# Patient Record
Sex: MALE | Race: WHITE | NOT HISPANIC OR LATINO | Employment: FULL TIME | URBAN - METROPOLITAN AREA
[De-identification: names, ages, dates, MRNs, and addresses within clinical notes are randomized per-mention and may not be internally consistent; named-entity substitution may affect disease eponyms.]

---

## 2017-08-21 ENCOUNTER — OFFICE VISIT (OUTPATIENT)
Dept: FAMILY MEDICINE | Facility: CLINIC | Age: 27
End: 2017-08-21
Payer: COMMERCIAL

## 2017-08-21 VITALS
HEART RATE: 61 BPM | SYSTOLIC BLOOD PRESSURE: 140 MMHG | TEMPERATURE: 98.5 F | DIASTOLIC BLOOD PRESSURE: 100 MMHG | OXYGEN SATURATION: 98 % | BODY MASS INDEX: 41.43 KG/M2 | WEIGHT: 273.4 LBS | HEIGHT: 68 IN

## 2017-08-21 DIAGNOSIS — F41.9 ANXIETY: Primary | ICD-10-CM

## 2017-08-21 DIAGNOSIS — Z13.29 SCREENING FOR THYROID DISORDER: ICD-10-CM

## 2017-08-21 DIAGNOSIS — Z13.0 SCREENING FOR DISORDER OF BLOOD AND BLOOD-FORMING ORGANS: ICD-10-CM

## 2017-08-21 DIAGNOSIS — R03.0 ELEVATED BLOOD PRESSURE READING WITHOUT DIAGNOSIS OF HYPERTENSION: ICD-10-CM

## 2017-08-21 DIAGNOSIS — Z13.220 LIPID SCREENING: ICD-10-CM

## 2017-08-21 DIAGNOSIS — Z13.1 SCREENING FOR DIABETES MELLITUS: ICD-10-CM

## 2017-08-21 PROCEDURE — 99214 OFFICE O/P EST MOD 30 MIN: CPT | Performed by: PHYSICIAN ASSISTANT

## 2017-08-21 RX ORDER — ESCITALOPRAM OXALATE 20 MG/1
TABLET ORAL
Refills: 1 | COMMUNITY
Start: 2017-08-15 | End: 2017-08-21

## 2017-08-21 RX ORDER — ESCITALOPRAM OXALATE 20 MG/1
TABLET ORAL
Qty: 30 TABLET | Refills: 1 | Status: SHIPPED | OUTPATIENT
Start: 2017-08-21 | End: 2017-11-30

## 2017-08-21 RX ORDER — BUSPIRONE HYDROCHLORIDE 5 MG/1
TABLET ORAL
Qty: 150 TABLET | Refills: 0 | Status: SHIPPED | OUTPATIENT
Start: 2017-08-21 | End: 2017-10-02 | Stop reason: DRUGHIGH

## 2017-08-21 ASSESSMENT — PATIENT HEALTH QUESTIONNAIRE - PHQ9
5. POOR APPETITE OR OVEREATING: NEARLY EVERY DAY
SUM OF ALL RESPONSES TO PHQ QUESTIONS 1-9: 7

## 2017-08-21 ASSESSMENT — ANXIETY QUESTIONNAIRES
6. BECOMING EASILY ANNOYED OR IRRITABLE: NEARLY EVERY DAY
IF YOU CHECKED OFF ANY PROBLEMS ON THIS QUESTIONNAIRE, HOW DIFFICULT HAVE THESE PROBLEMS MADE IT FOR YOU TO DO YOUR WORK, TAKE CARE OF THINGS AT HOME, OR GET ALONG WITH OTHER PEOPLE: SOMEWHAT DIFFICULT
GAD7 TOTAL SCORE: 15
2. NOT BEING ABLE TO STOP OR CONTROL WORRYING: NEARLY EVERY DAY
7. FEELING AFRAID AS IF SOMETHING AWFUL MIGHT HAPPEN: NOT AT ALL
1. FEELING NERVOUS, ANXIOUS, OR ON EDGE: NEARLY EVERY DAY
5. BEING SO RESTLESS THAT IT IS HARD TO SIT STILL: NOT AT ALL
3. WORRYING TOO MUCH ABOUT DIFFERENT THINGS: NEARLY EVERY DAY

## 2017-08-21 NOTE — MR AVS SNAPSHOT
After Visit Summary   8/21/2017    Chao Pruett    MRN: [id number]           Patient Information     Date Of Birth          1990        Visit Information        Provider Department      8/21/2017 4:15 PM Aaseby-Aguilera, Ramona Ann, PA-C [facility]        Today's Diagnoses     Anxiety    -  1    Screening for diabetes mellitus        Lipid screening        Screening for disorder of blood and blood-forming organs        Screening for thyroid disorder        Elevated blood pressure reading without diagnosis of hypertension          Care Instructions    (F41.9) Anxiety  (primary encounter diagnosis)  Comment: well add buspar  Plan: busPIRone (BUSPAR) 5 MG tablet, escitalopram         (LEXAPRO) 20 MG tablet     Start at 5 mg twice daily for 3 days,   then 7.5 mg (1.5 tabs) twice daily for 3 days,   then 10 mg (2 tabs) twice daily for 3 days,   then 12.5 mg (2.5 tabs) twice daily for 3 days,   then 15 mg (3 tabs) twice daily and stay at that dose  Follow-up in 3 weeks and make it a physical     (Z13.1) Screening for diabetes mellitus  Comment:   Plan: Comprehensive metabolic panel            (Z13.220) Lipid screening  Comment:   Plan: Lipid panel reflex to direct LDL            (Z13.0) Screening for disorder of blood and blood-forming organs  Comment:   Plan: CBC with platelets            (Z13.29) Screening for thyroid disorder  Comment:   Plan: TSH with free T4 reflex            (R03.0) Elevated blood pressure reading without diagnosis of hypertension  Comment:   Plan:  Will have come in for nurse only BP check x 2 and then follow-up for office visit in 3 weeks  with BP diary.                  Follow-ups after your visit        Future tests that were ordered for you today     Open Future Orders        Priority Expected Expires Ordered    CBC with platelets Routine  12/21/2017 8/21/2017    TSH with free T4 reflex Routine  12/21/2017 8/21/2017    Comprehensive metabolic panel  "Routine  12/21/2017 8/21/2017    Lipid panel reflex to direct LDL Routine  12/21/2017 8/21/2017            Who to contact     If you have questions or need follow up information about today's clinic visit or your schedule please contact [facility] directly at [phone].  Normal or non-critical lab and imaging results will be communicated to you by MyChart, letter or phone within 4 business days after the clinic has received the results. If you do not hear from us within 7 days, please contact the clinic through [employer] or phone. If you have a critical or abnormal lab result, we will notify you by phone as soon as possible.  Submit refill requests through [employer] or call your pharmacy and they will forward the refill request to us. Please allow 3 business days for your refill to be completed.          Additional Information About Your Visit        MyChart Information     [employer] gives you secure access to your electronic health record. If you see a primary care provider, you can also send messages to your care team and make appointments. If you have questions, please call your primary care clinic.  If you do not have a primary care provider, please call [phone] and they will assist you.        Care EveryWhere ID     This is your Care EveryWhere ID. This could be used by other organizations to access your [place] medical records  [id number]        Your Vitals Were     Pulse Temperature Height Pulse Oximetry BMI (Body Mass Index)       61 98.5  F (36.9  C) (Oral) 5' 8\" (1.727 m) 98% 41.57 kg/m2        Blood Pressure from Last 3 Encounters:   08/21/17 (!) 140/100   06/23/14 130/80   08/06/13 122/78    Weight from Last 3 Encounters:   08/21/17 273 lb 6.4 oz (124 kg)   06/23/14 217 lb 8 oz (98.7 kg)   08/06/13 201 lb 1.6 oz (91.2 kg)                 Today's Medication Changes          These changes are accurate as of: 8/21/17  4:49 PM.  If you have any questions, ask your nurse or doctor.       "         Start taking these medicines.        Dose/Directions    busPIRone 5 MG tablet   Commonly known as:  BUSPAR   Used for:  Anxiety   Started by:  Aaseby-Aguilera, Ramona Ann, PA-C        Start at 5 mg twice daily for 3 days, then 7.5 mg (1.5 tabs) twice daily for 3 days, then 10 mg (2 tabs) twice daily for 3 days, then 12.5 mg (2.5 tabs) twice daily for 3 days, then 15 mg (3 tabs) twice daily and stay at that dose   Quantity:  150 tablet   Refills:  0         These medicines have changed or have updated prescriptions.        Dose/Directions    escitalopram 20 MG tablet   Commonly known as:  LEXAPRO   This may have changed:  See the new instructions.   Used for:  Anxiety   Changed by:  Aaseby-Aguilera, Ramona Ann, PA-C        TAKE 1 TABLET BY MOUTH ONCE DAILY.   Quantity:  30 tablet   Refills:  1            Where to get your medicines      These medications were sent to [place] [id number]    Hours:  Tech issues with their phone system Phone:  [phone]     escitalopram 20 MG tablet         Some of these will need a paper prescription and others can be bought over the counter.  Ask your nurse if you have questions.     Bring a paper prescription for each of these medications     busPIRone 5 MG tablet                Primary Care Provider Office Phone # Fax #    Ramona Ann Aaseby-Aguilera, PA-C [phone] [address] [address] [address]        Equal Access to Services     WILSON SWANSON : Hadii aad ku hadasho Soomaali, waaxda luqadaha, qaybta kaalmada adeegyada, waxay cecein haypratibhan khari grant. So [facility] [phone].    ATENCIÓN: Si habla español, tiene a castellano disposición servicios gratuitos de asistencia lingüística. Llame al [phone].    We comply with applicable federal civil rights laws and [place] laws. We do not discriminate on the basis of race, color, national origin, age, disability  sex, sexual orientation or gender identity.            Thank you!     Thank you for choosing [facility]  for your care. Our goal is always to provide you with excellent care. Hearing back from our patients is one way we can continue to improve our services. Please take a few minutes to complete the written survey that you may receive in the mail after your visit with us. Thank you!             Your Updated Medication List - Protect others around you: Learn how to safely use, store and throw away your medicines at www.disposemymeds.org.          This list is accurate as of: 8/21/17  4:49 PM.  Always use your most recent med list.                   Brand Name Dispense Instructions for use Diagnosis    busPIRone 5 MG tablet    BUSPAR    150 tablet    Start at 5 mg twice daily for 3 days, then 7.5 mg (1.5 tabs) twice daily for 3 days, then 10 mg (2 tabs) twice daily for 3 days, then 12.5 mg (2.5 tabs) twice daily for 3 days, then 15 mg (3 tabs) twice daily and stay at that dose    Anxiety       escitalopram 20 MG tablet    LEXAPRO    30 tablet    TAKE 1 TABLET BY MOUTH ONCE DAILY.    Anxiety

## 2017-08-21 NOTE — NURSING NOTE
"Chief Complaint   Patient presents with     Recheck Medication       Initial BP (!) 140/100 (BP Location: Right arm, Patient Position: Chair, Cuff Size: Adult Large)  Pulse 61  Temp 98.5  F (36.9  C) (Oral)  Ht 5' 8\" (1.727 m)  Wt 273 lb 6.4 oz (124 kg)  SpO2 98%  BMI 41.57 kg/m2 Estimated body mass index is 41.57 kg/(m^2) as calculated from the following:    Height as of this encounter: 5' 8\" (1.727 m).    Weight as of this encounter: 273 lb 6.4 oz (124 kg).  Medication Reconciliation: complete   NESTOR Sung      "

## 2017-08-21 NOTE — PROGRESS NOTES
"  SUBJECTIVE:   Chao Pruett is a 26 year old male who presents to clinic today for the following health issues:      Medication Followup of escitalopram     Taking Medication as prescribed: NO-doubling the dose     Side Effects:  None    Medication Helping Symptoms:  NO-needs more        Also, he has additional complaints of blood pressure elevated.        Problem list and histories reviewed & adjusted, as indicated.  Additional history: as documented    Current Outpatient Prescriptions   Medication Sig Dispense Refill     busPIRone (BUSPAR) 5 MG tablet Start at 5 mg twice daily for 3 days, then 7.5 mg (1.5 tabs) twice daily for 3 days, then 10 mg (2 tabs) twice daily for 3 days, then 12.5 mg (2.5 tabs) twice daily for 3 days, then 15 mg (3 tabs) twice daily and stay at that dose 150 tablet 0     escitalopram (LEXAPRO) 20 MG tablet TAKE 1 TABLET BY MOUTH ONCE DAILY. 30 tablet 1     BP Readings from Last 3 Encounters:   08/21/17 (!) 140/100   06/23/14 130/80   08/06/13 122/78    Wt Readings from Last 3 Encounters:   08/21/17 273 lb 6.4 oz (124 kg)   06/23/14 217 lb 8 oz (98.7 kg)   08/06/13 201 lb 1.6 oz (91.2 kg)                      Reviewed and updated as needed this visit by clinical staffAllergies       Reviewed and updated as needed this visit by Provider         ROS:  Constitutional, HEENT, cardiovascular, pulmonary, gi and gu systems are negative, except as otherwise noted.      OBJECTIVE:                                                    BP (!) 140/100 (BP Location: Right arm, Patient Position: Chair, Cuff Size: Adult Large)  Pulse 61  Temp 98.5  F (36.9  C) (Oral)  Ht 5' 8\" (1.727 m)  Wt 273 lb 6.4 oz (124 kg)  SpO2 98%  BMI 41.57 kg/m2  Body mass index is 41.57 kg/(m^2).  GENERAL APPEARANCE: healthy, alert and no distress  RESP: lungs clear to auscultation - no rales, rhonchi or wheezes  CV: regular rates and rhythm, normal S1 S2, no S3 or S4 and no murmur, click or rub  MS: extremities " normal- no gross deformities noted  MENTAL STATUS EXAM:  Appearance/Behavior: No apparent distress and Casually groomed  Speech: Normal  Mood/Affect: depressed affect  Insight: Adequate      Diagnostic test results:  Diagnostic Test Results:  none        ASSESSMENT/PLAN:                                                    1. Screening for diabetes mellitus    - Comprehensive metabolic panel; Future    2. Lipid screening    - Lipid panel reflex to direct LDL; Future    3. Screening for disorder of blood and blood-forming organs    - CBC with platelets; Future    4. Screening for thyroid disorder    - TSH with free T4 reflex; Future    5. Anxiety    - busPIRone (BUSPAR) 5 MG tablet; Start at 5 mg twice daily for 3 days, then 7.5 mg (1.5 tabs) twice daily for 3 days, then 10 mg (2 tabs) twice daily for 3 days, then 12.5 mg (2.5 tabs) twice daily for 3 days, then 15 mg (3 tabs) twice daily and stay at that dose  Dispense: 150 tablet; Refill: 0  - escitalopram (LEXAPRO) 20 MG tablet; TAKE 1 TABLET BY MOUTH ONCE DAILY.  Dispense: 30 tablet; Refill: 1    6. Elevated blood pressure reading without diagnosis of hypertension        See Patient Instructions    Ramona Ann Aaseby-Aguilera, PA-C  [facility]  Patient Instructions   (F41.9) Anxiety  (primary encounter diagnosis)  Comment: well add buspar  Plan: busPIRone (BUSPAR) 5 MG tablet, escitalopram         (LEXAPRO) 20 MG tablet     Start at 5 mg twice daily for 3 days,   then 7.5 mg (1.5 tabs) twice daily for 3 days,   then 10 mg (2 tabs) twice daily for 3 days,   then 12.5 mg (2.5 tabs) twice daily for 3 days,   then 15 mg (3 tabs) twice daily and stay at that dose  Follow-up in 3 weeks and make it a physical     (Z13.1) Screening for diabetes mellitus  Comment:   Plan: Comprehensive metabolic panel            (Z13.220) Lipid screening  Comment:   Plan: Lipid panel reflex to direct LDL            (Z13.0) Screening for disorder of blood and blood-forming  organs  Comment:   Plan: CBC with platelets            (Z13.29) Screening for thyroid disorder  Comment:   Plan: TSH with free T4 reflex            (R03.0) Elevated blood pressure reading without diagnosis of hypertension  Comment:   Plan:  Will have come in for nurse only BP check x 2 and then follow-up for office visit in 3 weeks  with BP diary.

## 2017-08-21 NOTE — PATIENT INSTRUCTIONS
(F41.9) Anxiety  (primary encounter diagnosis)  Comment: well add buspar  Plan: busPIRone (BUSPAR) 5 MG tablet, escitalopram         (LEXAPRO) 20 MG tablet     Start at 5 mg twice daily for 3 days,   then 7.5 mg (1.5 tabs) twice daily for 3 days,   then 10 mg (2 tabs) twice daily for 3 days,   then 12.5 mg (2.5 tabs) twice daily for 3 days,   then 15 mg (3 tabs) twice daily and stay at that dose  Follow-up in 3 weeks and make it a physical     (Z13.1) Screening for diabetes mellitus  Comment:   Plan: Comprehensive metabolic panel            (Z13.220) Lipid screening  Comment:   Plan: Lipid panel reflex to direct LDL            (Z13.0) Screening for disorder of blood and blood-forming organs  Comment:   Plan: CBC with platelets            (Z13.29) Screening for thyroid disorder  Comment:   Plan: TSH with free T4 reflex            (R03.0) Elevated blood pressure reading without diagnosis of hypertension  Comment:   Plan:  Will have come in for nurse only BP check x 2 and then follow-up for office visit in 3 weeks  with BP diary.

## 2017-08-22 ASSESSMENT — ANXIETY QUESTIONNAIRES: GAD7 TOTAL SCORE: 15

## 2017-09-01 ENCOUNTER — ALLIED HEALTH/NURSE VISIT (OUTPATIENT)
Dept: NURSING | Facility: CLINIC | Age: 27
End: 2017-09-01
Payer: COMMERCIAL

## 2017-09-01 VITALS — DIASTOLIC BLOOD PRESSURE: 86 MMHG | SYSTOLIC BLOOD PRESSURE: 130 MMHG

## 2017-09-01 DIAGNOSIS — Z13.0 SCREENING FOR DISORDER OF BLOOD AND BLOOD-FORMING ORGANS: ICD-10-CM

## 2017-09-01 DIAGNOSIS — Z13.220 LIPID SCREENING: ICD-10-CM

## 2017-09-01 DIAGNOSIS — Z13.29 SCREENING FOR THYROID DISORDER: ICD-10-CM

## 2017-09-01 DIAGNOSIS — Z01.30 BP CHECK: Primary | ICD-10-CM

## 2017-09-01 DIAGNOSIS — Z13.1 SCREENING FOR DIABETES MELLITUS: ICD-10-CM

## 2017-09-01 LAB
ERYTHROCYTE [DISTWIDTH] IN BLOOD BY AUTOMATED COUNT: 13.2 % (ref 10–15)
HCT VFR BLD AUTO: 45.8 % (ref 40–53)
HGB BLD-MCNC: 16.7 G/DL (ref 13.3–17.7)
MCH RBC QN AUTO: 28.8 PG (ref 26.5–33)
MCHC RBC AUTO-ENTMCNC: 36.5 G/DL (ref 31.5–36.5)
MCV RBC AUTO: 79 FL (ref 78–100)
PLATELET # BLD AUTO: 201 10E9/L (ref 150–450)
RBC # BLD AUTO: 5.79 10E12/L (ref 4.4–5.9)
WBC # BLD AUTO: 9.6 10E9/L (ref 4–11)

## 2017-09-01 PROCEDURE — 99207 ZZC NO CHARGE NURSE ONLY: CPT

## 2017-09-01 PROCEDURE — 80053 COMPREHEN METABOLIC PANEL: CPT | Performed by: PHYSICIAN ASSISTANT

## 2017-09-01 PROCEDURE — 36415 COLL VENOUS BLD VENIPUNCTURE: CPT | Performed by: PHYSICIAN ASSISTANT

## 2017-09-01 PROCEDURE — 84443 ASSAY THYROID STIM HORMONE: CPT | Performed by: PHYSICIAN ASSISTANT

## 2017-09-01 PROCEDURE — 80061 LIPID PANEL: CPT | Performed by: PHYSICIAN ASSISTANT

## 2017-09-01 PROCEDURE — 85027 COMPLETE CBC AUTOMATED: CPT | Performed by: PHYSICIAN ASSISTANT

## 2017-09-02 LAB
ALBUMIN SERPL-MCNC: 4.3 G/DL (ref 3.4–5)
ALP SERPL-CCNC: 84 U/L (ref 40–150)
ALT SERPL W P-5'-P-CCNC: 134 U/L (ref 0–70)
ANION GAP SERPL CALCULATED.3IONS-SCNC: 5 MMOL/L (ref 3–14)
AST SERPL W P-5'-P-CCNC: 50 U/L (ref 0–45)
BILIRUB SERPL-MCNC: 0.9 MG/DL (ref 0.2–1.3)
BUN SERPL-MCNC: 17 MG/DL (ref 7–30)
CALCIUM SERPL-MCNC: 9.6 MG/DL (ref 8.5–10.1)
CHLORIDE SERPL-SCNC: 106 MMOL/L (ref 94–109)
CHOLEST SERPL-MCNC: 193 MG/DL
CO2 SERPL-SCNC: 28 MMOL/L (ref 20–32)
CREAT SERPL-MCNC: 1.17 MG/DL (ref 0.66–1.25)
GFR SERPL CREATININE-BSD FRML MDRD: 75 ML/MIN/1.7M2
GLUCOSE SERPL-MCNC: 91 MG/DL (ref 70–99)
HDLC SERPL-MCNC: 53 MG/DL
LDLC SERPL CALC-MCNC: 118 MG/DL
NONHDLC SERPL-MCNC: 140 MG/DL
POTASSIUM SERPL-SCNC: 4.3 MMOL/L (ref 3.4–5.3)
PROT SERPL-MCNC: 7.7 G/DL (ref 6.8–8.8)
SODIUM SERPL-SCNC: 139 MMOL/L (ref 133–144)
TRIGL SERPL-MCNC: 111 MG/DL
TSH SERPL DL<=0.005 MIU/L-ACNC: 0.57 MU/L (ref 0.4–4)

## 2017-09-25 DIAGNOSIS — F41.9 ANXIETY: ICD-10-CM

## 2017-09-25 NOTE — LETTER
[facility]  [address] [address] [address] [phone]          September 28, 2017    Chao Pruett                                                                                                                     [address] [address] [address]            Dear Chao,    We have made several attempts to contact you by phone regarding a medication refill.  Since we have not heard from you we cannot refill the medication.  You are due for a physical and medication check.  Please contact the clinic at the number above.    Sincerely,     Ramona Ann Aaseby-Aguilera PA-C/Colten Archibald RN, BSN

## 2017-09-25 NOTE — TELEPHONE ENCOUNTER
Pending Prescriptions:                       Disp   Refills    busPIRone (BUSPAR) 5 MG tablet [Pharmacy *150 ta*0            Sig: SEE ATTACHED INSTRUCTIONS               Last Written Prescription Date: 8/21/2017  Last Fill Quantity: 150; # refills: 0  Last Office Visit with [facility], [facility] or [facility] prescribing provider:  8/21/2017, Aaseby-Aguilera          Last PHQ-9 score on record=   PHQ-9 SCORE 8/21/2017   Total Score -   Total Score 7       Lab Results   Component Value Date    AST 50 09/01/2017     Lab Results   Component Value Date     09/01/2017

## 2017-09-26 NOTE — TELEPHONE ENCOUNTER
LMTRC  Need to verify current dose and pt is due for a physical per LOV    Start at 5 mg twice daily for 3 days,   then 7.5 mg (1.5 tabs) twice daily for 3 days,   then 10 mg (2 tabs) twice daily for 3 days,   then 12.5 mg (2.5 tabs) twice daily for 3 days,   then 15 mg (3 tabs) twice daily and stay at that dose  Follow-up in 3 weeks and make it a physical

## 2017-09-28 RX ORDER — BUSPIRONE HYDROCHLORIDE 5 MG/1
TABLET ORAL
Qty: 150 TABLET | Refills: 0 | OUTPATIENT
Start: 2017-09-28

## 2017-09-28 NOTE — TELEPHONE ENCOUNTER
Pt not returning calls to verify dosing and schedule physical  Please advise  Colten Archibald RN, BSN

## 2017-10-02 ENCOUNTER — OFFICE VISIT (OUTPATIENT)
Dept: FAMILY MEDICINE | Facility: CLINIC | Age: 27
End: 2017-10-02
Payer: COMMERCIAL

## 2017-10-02 VITALS
HEART RATE: 72 BPM | WEIGHT: 279.5 LBS | DIASTOLIC BLOOD PRESSURE: 70 MMHG | OXYGEN SATURATION: 97 % | BODY MASS INDEX: 42.36 KG/M2 | HEIGHT: 68 IN | SYSTOLIC BLOOD PRESSURE: 127 MMHG | TEMPERATURE: 98.1 F

## 2017-10-02 DIAGNOSIS — R74.8 ELEVATED LIVER ENZYMES: ICD-10-CM

## 2017-10-02 DIAGNOSIS — F41.9 ANXIETY: Primary | ICD-10-CM

## 2017-10-02 PROBLEM — F43.22 ADJUSTMENT DISORDER WITH ANXIOUS MOOD: Status: ACTIVE | Noted: 2017-10-02

## 2017-10-02 PROCEDURE — 80053 COMPREHEN METABOLIC PANEL: CPT | Performed by: PHYSICIAN ASSISTANT

## 2017-10-02 PROCEDURE — 99213 OFFICE O/P EST LOW 20 MIN: CPT | Performed by: PHYSICIAN ASSISTANT

## 2017-10-02 PROCEDURE — 36415 COLL VENOUS BLD VENIPUNCTURE: CPT | Performed by: PHYSICIAN ASSISTANT

## 2017-10-02 RX ORDER — BUSPIRONE HYDROCHLORIDE 15 MG/1
15 TABLET ORAL 2 TIMES DAILY
Qty: 60 TABLET | Refills: 5 | Status: SHIPPED | OUTPATIENT
Start: 2017-10-02 | End: 2017-11-30

## 2017-10-02 ASSESSMENT — ANXIETY QUESTIONNAIRES
IF YOU CHECKED OFF ANY PROBLEMS ON THIS QUESTIONNAIRE, HOW DIFFICULT HAVE THESE PROBLEMS MADE IT FOR YOU TO DO YOUR WORK, TAKE CARE OF THINGS AT HOME, OR GET ALONG WITH OTHER PEOPLE: NOT DIFFICULT AT ALL
7. FEELING AFRAID AS IF SOMETHING AWFUL MIGHT HAPPEN: NOT AT ALL
6. BECOMING EASILY ANNOYED OR IRRITABLE: SEVERAL DAYS
GAD7 TOTAL SCORE: 3
1. FEELING NERVOUS, ANXIOUS, OR ON EDGE: NOT AT ALL
3. WORRYING TOO MUCH ABOUT DIFFERENT THINGS: SEVERAL DAYS
5. BEING SO RESTLESS THAT IT IS HARD TO SIT STILL: NOT AT ALL
2. NOT BEING ABLE TO STOP OR CONTROL WORRYING: NOT AT ALL

## 2017-10-02 ASSESSMENT — PATIENT HEALTH QUESTIONNAIRE - PHQ9: 5. POOR APPETITE OR OVEREATING: SEVERAL DAYS

## 2017-10-02 NOTE — PROGRESS NOTES
"  SUBJECTIVE:   Chao Pruett is a 26 year old male who presents to clinic today for the following health issues:      Medication Followup of buspar    Taking Medication as prescribed: yes    Side Effects:  None    Medication Helping Symptoms:  yes             Problem list and histories reviewed & adjusted, as indicated.  Additional history: as documented    Current Outpatient Prescriptions   Medication Sig Dispense Refill     busPIRone (BUSPAR) 15 MG tablet Take 1 tablet (15 mg) by mouth 2 times daily 60 tablet 5     escitalopram (LEXAPRO) 20 MG tablet TAKE 1 TABLET BY MOUTH ONCE DAILY. 30 tablet 1     BP Readings from Last 3 Encounters:   10/02/17 127/70   09/01/17 130/86   08/21/17 (!) 140/100    Wt Readings from Last 3 Encounters:   10/02/17 279 lb 8 oz (126.8 kg)   08/21/17 273 lb 6.4 oz (124 kg)   06/23/14 217 lb 8 oz (98.7 kg)                      Reviewed and updated as needed this visit by clinical staffTobacco  Allergies  Meds  Med Hx  Surg Hx  Fam Hx  Soc Hx      Reviewed and updated as needed this visit by Provider         ROS:  Constitutional, HEENT, cardiovascular, pulmonary, gi and gu systems are negative, except as otherwise noted.      OBJECTIVE:                                                    /70 (BP Location: Right arm, Patient Position: Chair, Cuff Size: Adult Large)  Pulse 72  Temp 98.1  F (36.7  C) (Oral)  Ht 5' 8\" (1.727 m)  Wt 279 lb 8 oz (126.8 kg)  SpO2 97%  BMI 42.5 kg/m2  Body mass index is 42.5 kg/(m^2).  GENERAL APPEARANCE: healthy, alert and no distress  RESP: lungs clear to auscultation - no rales, rhonchi or wheezes  CV: regular rates and rhythm, normal S1 S2, no S3 or S4 and no murmur, click or rub  MENTAL STATUS EXAM:  Appearance/Behavior: No apparent distress and Neatly groomed  Speech: Normal  Mood/Affect: normal affect  Insight: Adequate      Diagnostic test results:  Diagnostic Test Results:  none        ASSESSMENT/PLAN:                                  "                   1. Anxiety  Stable and doing well   Refilled for 15 mg bid.   Follow-up in 6 months   - busPIRone (BUSPAR) 15 MG tablet; Take 1 tablet (15 mg) by mouth 2 times daily  Dispense: 60 tablet; Refill: 5    2. Elevated liver enzymes  Had elevated liver enzymes at routine health maintenance checkup.  Well repeat to document resolution   - Comprehensive metabolic panel      There are no Patient Instructions on file for this visit.    Ramona Ann Aaseby-Aguilera, PA-C  [facility]

## 2017-10-02 NOTE — MR AVS SNAPSHOT
"              After Visit Summary   10/2/2017    Chao Pruett    MRN: [id number]           Patient Information     Date Of Birth          1990        Visit Information        Provider Department      10/2/2017 4:00 PM Aaseby-Aguilera, Ramona Ann, PA-C [facility]        Today's Diagnoses     Anxiety    -  1    Elevated liver enzymes           Follow-ups after your visit        Who to contact     If you have questions or need follow up information about today's clinic visit or your schedule please contact [facility] directly at [phone].  Normal or non-critical lab and imaging results will be communicated to you by Ruth Kunstadter â€“ [employer], letter or phone within 4 business days after the clinic has received the results. If you do not hear from us within 7 days, please contact the clinic through [employer] or phone. If you have a critical or abnormal lab result, we will notify you by phone as soon as possible.  Submit refill requests through [employer] or call your pharmacy and they will forward the refill request to us. Please allow 3 business days for your refill to be completed.          Additional Information About Your Visit        MyChart Information     [employer] gives you secure access to your electronic health record. If you see a primary care provider, you can also send messages to your care team and make appointments. If you have questions, please call your primary care clinic.  If you do not have a primary care provider, please call [phone] and they will assist you.        Care EveryWhere ID     This is your Care EveryWhere ID. This could be used by other organizations to access your [place] medical records  [id number]        Your Vitals Were     Pulse Temperature Height Pulse Oximetry BMI (Body Mass Index)       72 98.1  F (36.7  C) (Oral) 5' 8\" (1.727 m) 97% 42.5 kg/m2        Blood Pressure from Last 3 Encounters:   10/02/17 127/70   09/01/17 130/86   08/21/17 (!) 140/100    " Weight from Last 3 Encounters:   10/02/17 279 lb 8 oz (126.8 kg)   08/21/17 273 lb 6.4 oz (124 kg)   06/23/14 217 lb 8 oz (98.7 kg)              We Performed the Following     Comprehensive metabolic panel          Today's Medication Changes          These changes are accurate as of: 10/2/17 11:59 PM.  If you have any questions, ask your nurse or doctor.               These medicines have changed or have updated prescriptions.        Dose/Directions    busPIRone 15 MG tablet   Commonly known as:  BUSPAR   This may have changed:    - medication strength  - how much to take  - how to take this  - when to take this  - additional instructions   Used for:  Anxiety   Changed by:  Aaseby-Aguilera, Ramona Ann, PA-C        Dose:  15 mg   Take 1 tablet (15 mg) by mouth 2 times daily   Quantity:  60 tablet   Refills:  5            Where to get your medicines      These medications were sent to [place] [address] [place] [address] [address]    Hours:  Tech issues with their phone system Phone:  [phone]     busPIRone 15 MG tablet                Primary Care Provider Office Phone # Fax #    Ramona Ann Aaseby-Aguilera, PA-C [phone] [address]  [facility] [address]        Equal Access to Services     WILSON SWANSON AH: Hadii jane ku hadasho Soomaali, waaxda luqadaha, qaybta kaalmada adeegyada, jeffrey reyes hayjennifer grant. So [facility] [phone].    ATENCIÓN: Si habla español, tiene a castellano disposición servicios gratuitos de asistencia lingüística. Llame al [phone].    We comply with applicable federal civil rights laws and [place] laws. We do not discriminate on the basis of race, color, national origin, age, disability, sex, sexual orientation, or gender identity.            Thank you!     Thank you for choosing [facility]  for your care. Our goal is always to provide you with excellent care. Hearing back from our  patients is one way we can continue to improve our services. Please take a few minutes to complete the written survey that you may receive in the mail after your visit with us. Thank you!             Your Updated Medication List - Protect others around you: Learn how to safely use, store and throw away your medicines at www.disposemymeds.org.          This list is accurate as of: 10/2/17 11:59 PM.  Always use your most recent med list.                   Brand Name Dispense Instructions for use Diagnosis    busPIRone 15 MG tablet    BUSPAR    60 tablet    Take 1 tablet (15 mg) by mouth 2 times daily    Anxiety       escitalopram 20 MG tablet    LEXAPRO    30 tablet    TAKE 1 TABLET BY MOUTH ONCE DAILY.    Anxiety

## 2017-10-02 NOTE — NURSING NOTE
"Chief Complaint   Patient presents with     Recheck Medication       Initial /70 (BP Location: Right arm, Patient Position: Chair, Cuff Size: Adult Large)  Pulse 72  Temp 98.1  F (36.7  C) (Oral)  Ht 5' 8\" (1.727 m)  Wt 279 lb 8 oz (126.8 kg)  SpO2 97%  BMI 42.5 kg/m2 Estimated body mass index is 42.5 kg/(m^2) as calculated from the following:    Height as of this encounter: 5' 8\" (1.727 m).    Weight as of this encounter: 279 lb 8 oz (126.8 kg).  Medication Reconciliation: complete Veronica Rehman CMA      "

## 2017-10-03 LAB
ALBUMIN SERPL-MCNC: 3.6 G/DL (ref 3.4–5)
ALP SERPL-CCNC: 78 U/L (ref 40–150)
ALT SERPL W P-5'-P-CCNC: 141 U/L (ref 0–70)
ANION GAP SERPL CALCULATED.3IONS-SCNC: 10 MMOL/L (ref 3–14)
AST SERPL W P-5'-P-CCNC: 42 U/L (ref 0–45)
BILIRUB SERPL-MCNC: 0.4 MG/DL (ref 0.2–1.3)
BUN SERPL-MCNC: 16 MG/DL (ref 7–30)
CALCIUM SERPL-MCNC: 9.3 MG/DL (ref 8.5–10.1)
CHLORIDE SERPL-SCNC: 107 MMOL/L (ref 94–109)
CO2 SERPL-SCNC: 26 MMOL/L (ref 20–32)
CREAT SERPL-MCNC: 1.16 MG/DL (ref 0.66–1.25)
GFR SERPL CREATININE-BSD FRML MDRD: 76 ML/MIN/1.7M2
GLUCOSE SERPL-MCNC: 81 MG/DL (ref 70–99)
POTASSIUM SERPL-SCNC: 4.1 MMOL/L (ref 3.4–5.3)
PROT SERPL-MCNC: 6.7 G/DL (ref 6.8–8.8)
SODIUM SERPL-SCNC: 143 MMOL/L (ref 133–144)

## 2017-10-03 ASSESSMENT — ANXIETY QUESTIONNAIRES: GAD7 TOTAL SCORE: 3

## 2017-11-30 ENCOUNTER — RADIANT APPOINTMENT (OUTPATIENT)
Dept: GENERAL RADIOLOGY | Facility: CLINIC | Age: 27
End: 2017-11-30
Attending: PHYSICIAN ASSISTANT
Payer: COMMERCIAL

## 2017-11-30 ENCOUNTER — OFFICE VISIT (OUTPATIENT)
Dept: FAMILY MEDICINE | Facility: CLINIC | Age: 27
End: 2017-11-30
Payer: COMMERCIAL

## 2017-11-30 VITALS
BODY MASS INDEX: 42.78 KG/M2 | OXYGEN SATURATION: 97 % | TEMPERATURE: 98.4 F | SYSTOLIC BLOOD PRESSURE: 137 MMHG | HEIGHT: 68 IN | HEART RATE: 60 BPM | DIASTOLIC BLOOD PRESSURE: 80 MMHG | WEIGHT: 282.3 LBS

## 2017-11-30 DIAGNOSIS — S99.921S INJURY OF TOE ON RIGHT FOOT, SEQUELA: Primary | ICD-10-CM

## 2017-11-30 DIAGNOSIS — F41.9 ANXIETY: ICD-10-CM

## 2017-11-30 DIAGNOSIS — S99.921S INJURY OF TOE ON RIGHT FOOT, SEQUELA: ICD-10-CM

## 2017-11-30 PROCEDURE — 73660 X-RAY EXAM OF TOE(S): CPT | Mod: RT

## 2017-11-30 PROCEDURE — 99213 OFFICE O/P EST LOW 20 MIN: CPT | Performed by: PHYSICIAN ASSISTANT

## 2017-11-30 RX ORDER — NAPROXEN 500 MG/1
500 TABLET ORAL 2 TIMES DAILY PRN
Qty: 30 TABLET | Refills: 1 | Status: SHIPPED | OUTPATIENT
Start: 2017-11-30 | End: 2018-10-09

## 2017-11-30 RX ORDER — BUSPIRONE HYDROCHLORIDE 15 MG/1
15 TABLET ORAL 2 TIMES DAILY
Qty: 60 TABLET | Refills: 5 | Status: SHIPPED | OUTPATIENT
Start: 2017-11-30 | End: 2019-06-04

## 2017-11-30 RX ORDER — HYDROCODONE BITARTRATE AND ACETAMINOPHEN 5; 325 MG/1; MG/1
1-2 TABLET ORAL EVERY 4 HOURS PRN
Qty: 20 TABLET | Refills: 0 | Status: SHIPPED | OUTPATIENT
Start: 2017-11-30 | End: 2017-11-30

## 2017-11-30 RX ORDER — HYDROCODONE BITARTRATE AND ACETAMINOPHEN 5; 325 MG/1; MG/1
1-2 TABLET ORAL EVERY 4 HOURS PRN
Qty: 20 TABLET | Refills: 0 | Status: SHIPPED | OUTPATIENT
Start: 2017-11-30 | End: 2018-10-09

## 2017-11-30 RX ORDER — ESCITALOPRAM OXALATE 20 MG/1
TABLET ORAL
Qty: 90 TABLET | Refills: 1 | Status: SHIPPED | OUTPATIENT
Start: 2017-11-30 | End: 2018-05-24

## 2017-11-30 NOTE — MR AVS SNAPSHOT
After Visit Summary   11/30/2017    Chao Pruett    MRN: [id number]           Patient Information     Date Of Birth          1990        Visit Information        Provider Department      11/30/2017 11:30 AM Aaseby-Aguilera, Ramona Ann, PA-C [facility]        Today's Diagnoses     Injury of toe on right foot, sequela    -  1    Anxiety          Care Instructions    (S99.921S) Injury of toe on right foot, sequela  (primary encounter diagnosis)  Comment:   Plan: XR Toe Right G/E 2 Views, naproxen (NAPROSYN)         500 MG tablet, HYDROcodone-acetaminophen         (NORCO) 5-325 MG per tablet                      Follow-ups after your visit        Who to contact     If you have questions or need follow up information about today's clinic visit or your schedule please contact [facility] directly at [phone].  Normal or non-critical lab and imaging results will be communicated to you by MyChart, letter or phone within 4 business days after the clinic has received the results. If you do not hear from us within 7 days, please contact the clinic through Poken[employer] or phone. If you have a critical or abnormal lab result, we will notify you by phone as soon as possible.  Submit refill requests through [employer] or call your pharmacy and they will forward the refill request to us. Please allow 3 business days for your refill to be completed.          Additional Information About Your Visit        MyChart Information     [employer] gives you secure access to your electronic health record. If you see a primary care provider, you can also send messages to your care team and make appointments. If you have questions, please call your primary care clinic.  If you do not have a primary care provider, please call [phone] and they will assist you.        Care EveryWhere ID     This is your Care EveryWhere ID. This could be used by other organizations to access your [facility]  "records  [id number]        Your Vitals Were     Pulse Temperature Height Pulse Oximetry BMI (Body Mass Index)       60 98.4  F (36.9  C) (Oral) 5' 8\" (1.727 m) 97% 42.92 kg/m2        Blood Pressure from Last 3 Encounters:   11/30/17 137/80   10/02/17 127/70   09/01/17 130/86    Weight from Last 3 Encounters:   11/30/17 282 lb 4.8 oz (128.1 kg)   10/02/17 279 lb 8 oz (126.8 kg)   08/21/17 273 lb 6.4 oz (124 kg)                 Today's Medication Changes          These changes are accurate as of: 11/30/17  1:27 PM.  If you have any questions, ask your nurse or doctor.               Start taking these medicines.        Dose/Directions    HYDROcodone-acetaminophen 5-325 MG per tablet   Commonly known as:  NORCO   Used for:  Injury of toe on right foot, sequela   Started by:  Aaseby-Aguilera, Ramona Ann, PA-C        Dose:  1-2 tablet   Take 1-2 tablets by mouth every 4 hours as needed for moderate to severe pain maximum 4 tablet(s) per day   Quantity:  20 tablet   Refills:  0       naproxen 500 MG tablet   Commonly known as:  NAPROSYN   Used for:  Injury of toe on right foot, sequela   Started by:  Aaseby-Aguilera, Ramona Ann, PA-C        Dose:  500 mg   Take 1 tablet (500 mg) by mouth 2 times daily as needed for moderate pain   Quantity:  30 tablet   Refills:  1            Where to get your medicines      These medications were sent to [id number] [place], [place] - [address] [address] [facility]  [address] [address] [facility] [address]     Phone:  [phone]     busPIRone 15 MG tablet    escitalopram 20 MG tablet    naproxen 500 MG tablet         Some of these will need a paper prescription and others can be bought over the counter.  Ask your nurse if you have questions.     Bring a paper prescription for each of these medications     HYDROcodone-acetaminophen 5-325 MG per tablet                Primary Care Provider Office Phone # Fax #    Ramona Ann Aaseby-Aguilera, PA-C [phone] [address] [address] [facility] [address]        Equal Access to Services     [facility] : Hadii jane ku hadxaviero Soamericoali, waaxda luqadaha, qaybta kaalmada kamalastaciaheather, jeffrey urbinadellaravi grant. So [facility] [phone].    ATENCIÓN: Si habla español, tiene a castellano disposición servicios gratuitos de asistencia lingüística. Azraame al [phone].    We comply with applicable federal civil rights laws and [place] laws. We do not discriminate on the basis of race, color, national origin, age, disability, sex, sexual orientation, or gender identity.            Thank you!     Thank you for choosing [facility]  for your care. Our goal is always to provide you with excellent care. Hearing back from our patients is one way we can continue to improve our services. Please take a few minutes to complete the written survey that you may receive in the mail after your visit with us. Thank you!             Your Updated Medication List - Protect others around you: Learn how to safely use, store and throw away your medicines at www.disposemymeds.org.          This list is accurate as of: 11/30/17  1:27 PM.  Always use your most recent med list.                   Brand Name Dispense Instructions for use Diagnosis    busPIRone 15 MG tablet    BUSPAR    60 tablet    Take 1 tablet (15 mg) by mouth 2 times daily    Anxiety       escitalopram 20 MG tablet    LEXAPRO    90 tablet    TAKE 1 TABLET BY MOUTH ONCE DAILY.    Anxiety       HYDROcodone-acetaminophen 5-325 MG per tablet    NORCO    20 tablet    Take 1-2 tablets by mouth every 4 hours as needed for moderate to severe pain maximum 4 tablet(s) per day    Injury of toe on right foot, sequela       naproxen 500 MG tablet    NAPROSYN    30 tablet    Take 1 tablet (500 mg) by mouth 2 times daily as needed for moderate pain    Injury of toe on right foot, sequela

## 2017-11-30 NOTE — PROGRESS NOTES
"  SUBJECTIVE:   Chao Pruett is a 27 year old male who presents to clinic today for the following health issues:      Dropped a piece of steel on right pinky toe last night  -    Swollen, red, hurts to walk         Problem list and histories reviewed & adjusted, as indicated.  Additional history: as documented        Reviewed and updated as needed this visit by clinical staff       Reviewed and updated as needed this visit by Provider         ROS:  Constitutional, HEENT, cardiovascular, pulmonary, gi and gu systems are negative, except as otherwise noted.      OBJECTIVE:                                                    /80 (BP Location: Right arm, Patient Position: Chair, Cuff Size: Adult Large)  Pulse 60  Temp 98.4  F (36.9  C) (Oral)  Ht 5' 8\" (1.727 m)  Wt 282 lb 4.8 oz (128.1 kg)  SpO2 97%  BMI 42.92 kg/m2  Body mass index is 42.92 kg/(m^2).  GENERAL APPEARANCE: healthy, alert and no distress  ORTHO: rt foot 5th tarsal: swollen and erythematous and TTP         ASSESSMENT/PLAN:                                                    1. Injury of toe on right foot, sequela  No fractured.  Advise naprosyn.  Does walk all day for his job so provided norco to take at night if needed.   - XR Toe Right G/E 2 Views; Future  - naproxen (NAPROSYN) 500 MG tablet; Take 1 tablet (500 mg) by mouth 2 times daily as needed for moderate pain  Dispense: 30 tablet; Refill: 1  - HYDROcodone-acetaminophen (NORCO) 5-325 MG per tablet; Take 1-2 tablets by mouth every 4 hours as needed for moderate to severe pain maximum 4 tablet(s) per day  Dispense: 20 tablet; Refill: 0    2. Anxiety  Stable and doing well   - busPIRone (BUSPAR) 15 MG tablet; Take 1 tablet (15 mg) by mouth 2 times daily  Dispense: 60 tablet; Refill: 5  - escitalopram (LEXAPRO) 20 MG tablet; TAKE 1 TABLET BY MOUTH ONCE DAILY.  Dispense: 90 tablet; Refill: 1      Patient Instructions   (S99.921S) Injury of toe on right foot, sequela  (primary encounter " diagnosis)  Comment:   Plan: XR Toe Right G/E 2 Views, naproxen (NAPROSYN)         500 MG tablet, HYDROcodone-acetaminophen         (NORCO) 5-325 MG per tablet                  Ramona Ann Aaseby-Aguilera, PA-C  [facility]

## 2017-11-30 NOTE — PATIENT INSTRUCTIONS
(Z40.448S) Injury of toe on right foot, sequela  (primary encounter diagnosis)  Comment:   Plan: XR Toe Right G/E 2 Views, naproxen (NAPROSYN)         500 MG tablet, HYDROcodone-acetaminophen         (NORCO) 5-325 MG per tablet

## 2017-11-30 NOTE — NURSING NOTE
"Chief Complaint   Patient presents with     right pinky toe -  injured       Initial /80 (BP Location: Right arm, Patient Position: Chair, Cuff Size: Adult Large)  Pulse 60  Temp 98.4  F (36.9  C) (Oral)  Ht 5' 8\" (1.727 m)  Wt 282 lb 4.8 oz (128.1 kg)  SpO2 97%  BMI 42.92 kg/m2 Estimated body mass index is 42.92 kg/(m^2) as calculated from the following:    Height as of this encounter: 5' 8\" (1.727 m).    Weight as of this encounter: 282 lb 4.8 oz (128.1 kg).  Medication Reconciliation: complete      Health Maintenance addressed:  NONE    n/a      "

## 2018-05-24 DIAGNOSIS — F41.9 ANXIETY: ICD-10-CM

## 2018-05-24 NOTE — TELEPHONE ENCOUNTER
"Requested Prescriptions   Pending Prescriptions Disp Refills     escitalopram (LEXAPRO) 20 MG tablet 90 tablet 1    Last Written Prescription Date:  11/30/2017  Last Fill Quantity: 90 tablet,  # refills: 1   Last office visit: 11/30/2017 with prescribing provider:  11/30/2017   Future Office Visit:     Sig: TAKE 1 TABLET BY MOUTH ONCE DAILY.    SSRIs Protocol Passed    5/24/2018  8:25 AM       Passed - Recent (12 mo) or future (30 days) visit within the authorizing provider's specialty    Patient had office visit in the last 12 months or has a visit in the next 30 days with authorizing provider or within the authorizing provider's specialty.  See \"Patient Info\" tab in inbasket, or \"Choose Columns\" in Meds & Orders section of the refill encounter.           Passed - Patient is age 18 or older          Arnulfo BURRELLT  "

## 2018-05-24 NOTE — TELEPHONE ENCOUNTER
Patient called back was given message.  He is stating a new job next week and didn't know his schedule.  He will call back to make an appointment.

## 2018-05-25 RX ORDER — ESCITALOPRAM OXALATE 20 MG/1
TABLET ORAL
Qty: 30 TABLET | Refills: 0 | Status: SHIPPED | OUTPATIENT
Start: 2018-05-25 | End: 2018-10-09

## 2018-07-13 PROBLEM — F41.9 ANXIETY: Status: ACTIVE | Noted: 2018-07-13

## 2018-10-09 ENCOUNTER — OFFICE VISIT (OUTPATIENT)
Dept: FAMILY MEDICINE | Facility: CLINIC | Age: 28
End: 2018-10-09
Payer: COMMERCIAL

## 2018-10-09 ENCOUNTER — HOSPITAL ENCOUNTER (OUTPATIENT)
Dept: CT IMAGING | Facility: CLINIC | Age: 28
Discharge: HOME | End: 2018-10-09
Attending: FAMILY MEDICINE | Admitting: FAMILY MEDICINE
Payer: COMMERCIAL

## 2018-10-09 VITALS
HEART RATE: 73 BPM | WEIGHT: 265 LBS | SYSTOLIC BLOOD PRESSURE: 150 MMHG | TEMPERATURE: 98.9 F | DIASTOLIC BLOOD PRESSURE: 98 MMHG | BODY MASS INDEX: 40.16 KG/M2 | HEIGHT: 68 IN

## 2018-10-09 DIAGNOSIS — K57.32 DIVERTICULITIS OF COLON: ICD-10-CM

## 2018-10-09 DIAGNOSIS — R19.5 LOOSE STOOLS: ICD-10-CM

## 2018-10-09 DIAGNOSIS — R03.0 ELEVATED BLOOD PRESSURE READING WITHOUT DIAGNOSIS OF HYPERTENSION: ICD-10-CM

## 2018-10-09 DIAGNOSIS — K57.32 DIVERTICULITIS OF COLON: Primary | ICD-10-CM

## 2018-10-09 PROBLEM — F43.22 ADJUSTMENT DISORDER WITH ANXIOUS MOOD: Status: RESOLVED | Noted: 2017-10-02 | Resolved: 2018-10-09

## 2018-10-09 LAB
BASOPHILS # BLD AUTO: 0 10E9/L (ref 0–0.2)
BASOPHILS NFR BLD AUTO: 0.2 %
DIFFERENTIAL METHOD BLD: ABNORMAL
EOSINOPHIL # BLD AUTO: 0.2 10E9/L (ref 0–0.7)
EOSINOPHIL NFR BLD AUTO: 1.4 %
ERYTHROCYTE [DISTWIDTH] IN BLOOD BY AUTOMATED COUNT: 13.1 % (ref 10–15)
HCT VFR BLD AUTO: 47.7 % (ref 40–53)
HGB BLD-MCNC: 16.7 G/DL (ref 13.3–17.7)
LACTOFERRIN STL QL IA: POSITIVE
LYMPHOCYTES # BLD AUTO: 1.5 10E9/L (ref 0.8–5.3)
LYMPHOCYTES NFR BLD AUTO: 13.5 %
MCH RBC QN AUTO: 27.6 PG (ref 26.5–33)
MCHC RBC AUTO-ENTMCNC: 35 G/DL (ref 31.5–36.5)
MCV RBC AUTO: 79 FL (ref 78–100)
MONOCYTES # BLD AUTO: 0.8 10E9/L (ref 0–1.3)
MONOCYTES NFR BLD AUTO: 7.2 %
NEUTROPHILS # BLD AUTO: 8.5 10E9/L (ref 1.6–8.3)
NEUTROPHILS NFR BLD AUTO: 77.7 %
PLATELET # BLD AUTO: 190 10E9/L (ref 150–450)
RBC # BLD AUTO: 6.05 10E12/L (ref 4.4–5.9)
WBC # BLD AUTO: 11 10E9/L (ref 4–11)

## 2018-10-09 PROCEDURE — 25000128 H RX IP 250 OP 636: Performed by: RADIOLOGY

## 2018-10-09 PROCEDURE — 74177 CT ABD & PELVIS W/CONTRAST: CPT

## 2018-10-09 PROCEDURE — 80053 COMPREHEN METABOLIC PANEL: CPT | Performed by: FAMILY MEDICINE

## 2018-10-09 PROCEDURE — 99214 OFFICE O/P EST MOD 30 MIN: CPT | Performed by: FAMILY MEDICINE

## 2018-10-09 PROCEDURE — 85025 COMPLETE CBC W/AUTO DIFF WBC: CPT | Performed by: FAMILY MEDICINE

## 2018-10-09 PROCEDURE — 36415 COLL VENOUS BLD VENIPUNCTURE: CPT | Performed by: FAMILY MEDICINE

## 2018-10-09 PROCEDURE — 83630 LACTOFERRIN FECAL (QUAL): CPT | Performed by: FAMILY MEDICINE

## 2018-10-09 RX ORDER — IOPAMIDOL 755 MG/ML
500 INJECTION, SOLUTION INTRAVASCULAR ONCE
Status: COMPLETED | OUTPATIENT
Start: 2018-10-09 | End: 2018-10-09

## 2018-10-09 RX ADMIN — SODIUM CHLORIDE 65 ML: 9 INJECTION, SOLUTION INTRAVENOUS at 14:29

## 2018-10-09 RX ADMIN — IOPAMIDOL 100 ML: 755 INJECTION, SOLUTION INTRAVENOUS at 14:29

## 2018-10-09 NOTE — MR AVS SNAPSHOT
After Visit Summary   10/9/2018    Chao Pruett    MRN: [id number]           Patient Information     Date Of Birth          1990        Visit Information        Provider Department      10/9/2018 10:00 AM Luz Maria Jones MD [facility]        Today's Diagnoses     Diverticulitis of colon    -  1    Loose stools        Elevated blood pressure reading without diagnosis of hypertension        BMI 40.0-44.9, adult (H)           Follow-ups after your visit        Follow-up notes from your care team     Return in about 2 months (around 12/9/2018) for BP Recheck.      Future tests that were ordered for you today     Open Future Orders        Priority Expected Expires Ordered    CT Abdomen Pelvis w Contrast Routine  10/9/2019 10/9/2018            Who to contact     If you have questions or need follow up information about today's clinic visit or your schedule please contact [facility] directly at [phone].  Normal or non-critical lab and imaging results will be communicated to you by [employer], letter or phone within 4 business days after the clinic has received the results. If you do not hear from us within 7 days, please contact the clinic through [employer] or phone. If you have a critical or abnormal lab result, we will notify you by phone as soon as possible.  Submit refill requests through [employer] or call your pharmacy and they will forward the refill request to us. Please allow 3 business days for your refill to be completed.          Additional Information About Your Visit        MyChart Information     [employer] gives you secure access to your electronic health record. If you see a primary care provider, you can also send messages to your care team and make appointments. If you have questions, please call your primary care clinic.  If you do not have a primary care provider, please call [phone] and they will assist you.        Care EveryWhere ID      "This is your Care EveryWhere ID. This could be used by other organizations to access your [facility] medical records  [id number]        Your Vitals Were     Pulse Temperature Height BMI (Body Mass Index)          73 98.9  F (37.2  C) (Oral) 5' 8\" (1.727 m) 40.29 kg/m2         Blood Pressure from Last 3 Encounters:   10/09/18 (!) 150/98   11/30/17 137/80   10/02/17 127/70    Weight from Last 3 Encounters:   10/09/18 265 lb (120.2 kg)   11/30/17 282 lb 4.8 oz (128.1 kg)   10/02/17 279 lb 8 oz (126.8 kg)              We Performed the Following     CBC with platelets and differential     Comprehensive metabolic panel (BMP + Alb, Alk Phos, ALT, AST, Total. Bili, TP)     Fecal Lactoferrin          Today's Medication Changes          These changes are accurate as of 10/9/18 11:14 AM.  If you have any questions, ask your nurse or doctor.               Stop taking these medicines if you haven't already. Please contact your care team if you have questions.     escitalopram 20 MG tablet   Commonly known as:  LEXAPRO   Stopped by:  Luz Maria Jones MD           naproxen 500 MG tablet   Commonly known as:  NAPROSYN   Stopped by:  Luz Maria Jones MD                    Primary Care Provider Office Phone # Fax #    Karey Ann Aaseby-Aguilera, PA-C [phone] [facility] [address]        Equal Access to Services     [facility] AH: Hadii aad ku hadasho Soomaali, waaxda luqadaha, qaybta kaalmada adeegyada, waxnathan grant. So [facility] [phone].    ATENCIÓN: Si barryla chano, tiene a castellano disposición servicios gratuitos de asistencia lingüística. Llame al [phone].    We comply with applicable federal civil rights laws and [place] laws. We do not discriminate on the basis of race, color, national origin, age, disability, sex, sexual orientation, or gender identity.            Thank you!     Thank you for choosing [facility]  for your care. Our " goal is always to provide you with excellent care. Hearing back from our patients is one way we can continue to improve our services. Please take a few minutes to complete the written survey that you may receive in the mail after your visit with us. Thank you!             Your Updated Medication List - Protect others around you: Learn how to safely use, store and throw away your medicines at www.disposemymeds.org.          This list is accurate as of 10/9/18 11:14 AM.  Always use your most recent med list.                   Brand Name Dispense Instructions for use Diagnosis    busPIRone 15 MG tablet    BUSPAR    60 tablet    Take 1 tablet (15 mg) by mouth 2 times daily    Anxiety

## 2018-10-09 NOTE — LETTER
[facility]  [id number] [place] [id number]  Phone: [phone]    10/09/18    Chao DOM Pruett  [facility] [place] [address]      To whom it may concern:     Elpidio was seen in clinic today. He is unable to work Tuesday, October 9th and Wednesday, October 10th.       Sincerely,        Luz Maria Jones MD

## 2018-10-09 NOTE — PROGRESS NOTES
SUBJECTIVE:   Chao Pruett is a 28 year old male who presents to clinic today for the following health issues:      Diarrhea      Duration: almost one week    Description:       Consistency of stool: watery and runny       Blood in stool: no        Number of loose stools past 24 hours: 10    Intensity:  severe    Accompanying signs and symptoms:       Fever: no        Nausea/vomitting: YES vomiting       Abdominal pain: YES       Weight loss: YES- some    History (recent antibiotics or travel/ill contacts/med changes/testing done): none    Precipitating or alleviating factors: waking at night    Therapies tried and outcome: tums not helping      No mucous in the stool.   No recent abx.   No new pets.  No recent travel.     Eating well, stooling does not necessarily come shortly after eating.     Crampy abdominal pain following stooling, otherwise not much.     Vomiting overnight only.   Indigestion later in the evening.     Hx of appendectomy.   No FH GI issues.       Health maintenance- up to date      Problem list and histories reviewed & adjusted, as indicated.  Additional history: as documented    Patient Active Problem List   Diagnosis     Attention deficit disorder of childhood     Anxiety     Elevated blood pressure reading without diagnosis of hypertension     Past Surgical History:   Procedure Laterality Date     APPENDECTOMY       FRACTURE TX, WRIST RT/LT      LT - hardware removed     HC OPEN TX TIBIAL SHAFT FX W PLATE/SCREWS W/WO CERCLAGE  2008       Social History   Substance Use Topics     Smoking status: Never Smoker     Smokeless tobacco: Never Used     Alcohol use No     Family History   Problem Relation Age of Onset     Hypertension Mother      Lipids Mother      Hypertension Father      Lipids Father      Cancer Maternal Grandmother      ovarian     Diabetes Maternal Grandfather      Lipids Paternal Grandfather            Reviewed and updated as needed this visit by clinical  "staff  Allergies       Reviewed and updated as needed this visit by Provider         ROS:  Constitutional, HEENT, cardiovascular, pulmonary, gi and gu systems are negative, except as otherwise noted.    OBJECTIVE:     BP (!) 150/98 (BP Location: Right arm, Patient Position: Chair, Cuff Size: Adult Large)  Pulse 73  Temp 98.9  F (37.2  C) (Oral)  Ht 5' 8\" (1.727 m)  Wt 265 lb (120.2 kg)  BMI 40.29 kg/m2  Body mass index is 40.29 kg/(m^2).  GENERAL: healthy, alert and no distress  NECK: no adenopathy, no asymmetry, masses, or scars and thyroid normal to palpation  RESP: lungs clear to auscultation - no rales, rhonchi or wheezes  CV: regular rate and rhythm, normal S1 S2, no S3 or S4, no murmur, click or rub, no peripheral edema and peripheral pulses strong  ABDOMEN: mildly ttp on the LLQ of the abdomen, soft, no hepatosplenomegaly, no masses and bowel sounds normal    Diagnostic Test Results:  Results for orders placed or performed in visit on 10/09/18   CBC with platelets and differential   Result Value Ref Range    WBC 11.0 4.0 - 11.0 10e9/L    RBC Count 6.05 (H) 4.4 - 5.9 10e12/L    Hemoglobin 16.7 13.3 - 17.7 g/dL    Hematocrit 47.7 40.0 - 53.0 %    MCV 79 78 - 100 fl    MCH 27.6 26.5 - 33.0 pg    MCHC 35.0 31.5 - 36.5 g/dL    RDW 13.1 10.0 - 15.0 %    Platelet Count 190 150 - 450 10e9/L    Diff Method Automated Method     % Neutrophils 77.7 %    % Lymphocytes 13.5 %    % Monocytes 7.2 %    % Eosinophils 1.4 %    % Basophils 0.2 %    Absolute Neutrophil 8.5 (H) 1.6 - 8.3 10e9/L    Absolute Lymphocytes 1.5 0.8 - 5.3 10e9/L    Absolute Monocytes 0.8 0.0 - 1.3 10e9/L    Absolute Eosinophils 0.2 0.0 - 0.7 10e9/L    Absolute Basophils 0.0 0.0 - 0.2 10e9/L         ASSESSMENT/PLAN:     1. Diverticulitis of colon - suspected based on mild LLQ abd pain, mild neut shift, loose stools - still unclear as symptoms are not significant and lab results are mild. Will get imaging - discussed over the phone with Elpidio. If +, " will treat with abx.   - CT Abdomen Pelvis w Contrast; Future    2. Loose stools - as above  - CBC with platelets and differential  - Fecal Lactoferrin; Future  - Comprehensive metabolic panel (BMP + Alb, Alk Phos, ALT, AST, Total. Bili, TP)  - Fecal Lactoferrin  - CT Abdomen Pelvis w Contrast; Future    3. Elevated blood pressure reading without diagnosis of hypertension - discussed consequence of untreated HTN, advised recheck 1-2 months, close follow up.     4. BMI 40.0-44.9, adult (H) - discussed weight loss to help with BP issue, overall health    Luz Maria Jones MD  [facility]

## 2018-10-10 LAB
ALBUMIN SERPL-MCNC: 4.4 G/DL (ref 3.4–5)
ALP SERPL-CCNC: 92 U/L (ref 40–150)
ALT SERPL W P-5'-P-CCNC: 115 U/L (ref 0–70)
ANION GAP SERPL CALCULATED.3IONS-SCNC: 6 MMOL/L (ref 3–14)
AST SERPL W P-5'-P-CCNC: 38 U/L (ref 0–45)
BILIRUB SERPL-MCNC: 0.6 MG/DL (ref 0.2–1.3)
BUN SERPL-MCNC: 11 MG/DL (ref 7–30)
CALCIUM SERPL-MCNC: 9.6 MG/DL (ref 8.5–10.1)
CHLORIDE SERPL-SCNC: 106 MMOL/L (ref 94–109)
CO2 SERPL-SCNC: 27 MMOL/L (ref 20–32)
CREAT SERPL-MCNC: 1.25 MG/DL (ref 0.66–1.25)
GFR SERPL CREATININE-BSD FRML MDRD: 69 ML/MIN/1.7M2
GLUCOSE SERPL-MCNC: 105 MG/DL (ref 70–99)
POTASSIUM SERPL-SCNC: 4.7 MMOL/L (ref 3.4–5.3)
PROT SERPL-MCNC: 7.8 G/DL (ref 6.8–8.8)
SODIUM SERPL-SCNC: 139 MMOL/L (ref 133–144)

## 2019-06-04 ENCOUNTER — OFFICE VISIT (OUTPATIENT)
Dept: INTERNAL MEDICINE | Facility: CLINIC | Age: 29
End: 2019-06-04
Payer: COMMERCIAL

## 2019-06-04 VITALS
HEIGHT: 68 IN | DIASTOLIC BLOOD PRESSURE: 90 MMHG | TEMPERATURE: 98.5 F | OXYGEN SATURATION: 97 % | HEART RATE: 73 BPM | BODY MASS INDEX: 42.12 KG/M2 | RESPIRATION RATE: 18 BRPM | SYSTOLIC BLOOD PRESSURE: 152 MMHG | WEIGHT: 277.9 LBS

## 2019-06-04 DIAGNOSIS — N50.89 LUMP IN THE TESTICLE: Primary | ICD-10-CM

## 2019-06-04 PROCEDURE — 99213 OFFICE O/P EST LOW 20 MIN: CPT | Performed by: FAMILY MEDICINE

## 2019-06-04 ASSESSMENT — MIFFLIN-ST. JEOR: SCORE: 2210.05

## 2019-06-04 ASSESSMENT — PATIENT HEALTH QUESTIONNAIRE - PHQ9: SUM OF ALL RESPONSES TO PHQ QUESTIONS 1-9: 8

## 2019-06-05 NOTE — PROGRESS NOTES
"  CHIEF COMPLAINT    Check lump left testicle.      HISTORY    Patient has noticed a soft lump, nontender, located at the lower portion of left testicle.  He has noticed this over a week or 2.  He is otherwise asymptomatic but is concerned.    He has otherwise been in good health.    He and his wife are actually trying to create a pregnancy.      Patient Active Problem List   Diagnosis     Attention deficit disorder of childhood     Anxiety     Elevated blood pressure reading without diagnosis of hypertension     BMI 40.0-44.9, adult (H)       REVIEW OF SYSTEMS    Unremarkable except as above.      Past Medical History:   Diagnosis Date     HTN (hypertension)     resolved       EXAM  /90 (BP Location: Right arm, Patient Position: Sitting, Cuff Size: Adult Large)   Pulse 73   Temp 98.5  F (36.9  C) (Oral)   Resp 18   Ht 1.727 m (5' 8\")   Wt 126.1 kg (277 lb 14.4 oz)   SpO2 97%   BMI 42.25 kg/m      U G:  Exam shows a soft oval-shaped thickening about 1.5 cm size at the inferior pole of left testicle, actually in the epididymis itself.  No discrete testicular masses palpable.  Right side of scrotum is normal.  No inguinal hernias palpable.      (N50.9) Lump in the testicle  (primary encounter diagnosis)  Comment:     Likely this is an epididymal cyst or sperm granuloma.  He is basically asymptomatic except for feeling this.  I felt a period of observation would be reasonable.    Plan: UROLOGY ADULT REFERRAL          Advised that he observed for 3 to 4 weeks.  It may resolve on its own.  For persistence of this area or increasing in size, tenderness, discoloration that he should have a urologic consult and I did write a referral in that regard.  He voices understanding of recommendations.      "

## 2019-09-30 ENCOUNTER — HEALTH MAINTENANCE LETTER (OUTPATIENT)
Age: 29
End: 2019-09-30

## 2019-11-06 ENCOUNTER — OFFICE VISIT (OUTPATIENT)
Dept: FAMILY MEDICINE | Facility: CLINIC | Age: 29
End: 2019-11-06
Payer: COMMERCIAL

## 2019-11-06 VITALS
BODY MASS INDEX: 42.44 KG/M2 | RESPIRATION RATE: 16 BRPM | HEIGHT: 68 IN | TEMPERATURE: 99 F | OXYGEN SATURATION: 97 % | SYSTOLIC BLOOD PRESSURE: 136 MMHG | HEART RATE: 88 BPM | WEIGHT: 280 LBS | DIASTOLIC BLOOD PRESSURE: 86 MMHG

## 2019-11-06 DIAGNOSIS — F43.23 ADJUSTMENT DISORDER WITH MIXED ANXIETY AND DEPRESSED MOOD: Primary | ICD-10-CM

## 2019-11-06 DIAGNOSIS — R03.0 ELEVATED BLOOD PRESSURE READING WITHOUT DIAGNOSIS OF HYPERTENSION: ICD-10-CM

## 2019-11-06 PROCEDURE — 96127 BRIEF EMOTIONAL/BEHAV ASSMT: CPT | Mod: 59 | Performed by: FAMILY MEDICINE

## 2019-11-06 PROCEDURE — 99214 OFFICE O/P EST MOD 30 MIN: CPT | Performed by: FAMILY MEDICINE

## 2019-11-06 RX ORDER — HYDROXYZINE HYDROCHLORIDE 25 MG/1
25 TABLET, FILM COATED ORAL 3 TIMES DAILY PRN
Qty: 60 TABLET | Refills: 0 | Status: SHIPPED | OUTPATIENT
Start: 2019-11-06 | End: 2019-11-20

## 2019-11-06 RX ORDER — ESCITALOPRAM OXALATE 10 MG/1
10 TABLET ORAL DAILY
Qty: 30 TABLET | Refills: 0 | Status: SHIPPED | OUTPATIENT
Start: 2019-11-06 | End: 2019-11-20

## 2019-11-06 ASSESSMENT — PATIENT HEALTH QUESTIONNAIRE - PHQ9
SUM OF ALL RESPONSES TO PHQ QUESTIONS 1-9: 10
5. POOR APPETITE OR OVEREATING: NEARLY EVERY DAY

## 2019-11-06 ASSESSMENT — ANXIETY QUESTIONNAIRES
6. BECOMING EASILY ANNOYED OR IRRITABLE: MORE THAN HALF THE DAYS
7. FEELING AFRAID AS IF SOMETHING AWFUL MIGHT HAPPEN: NEARLY EVERY DAY
1. FEELING NERVOUS, ANXIOUS, OR ON EDGE: NEARLY EVERY DAY
IF YOU CHECKED OFF ANY PROBLEMS ON THIS QUESTIONNAIRE, HOW DIFFICULT HAVE THESE PROBLEMS MADE IT FOR YOU TO DO YOUR WORK, TAKE CARE OF THINGS AT HOME, OR GET ALONG WITH OTHER PEOPLE: VERY DIFFICULT
5. BEING SO RESTLESS THAT IT IS HARD TO SIT STILL: NOT AT ALL
GAD7 TOTAL SCORE: 17
3. WORRYING TOO MUCH ABOUT DIFFERENT THINGS: NEARLY EVERY DAY
2. NOT BEING ABLE TO STOP OR CONTROL WORRYING: NEARLY EVERY DAY

## 2019-11-06 ASSESSMENT — ENCOUNTER SYMPTOMS
AGITATION: 0
SLEEP DISTURBANCE: 0
PALPITATIONS: 0
NERVOUS/ANXIOUS: 1

## 2019-11-06 ASSESSMENT — MIFFLIN-ST. JEOR: SCORE: 2214.57

## 2019-11-06 NOTE — PROGRESS NOTES
Subjective     Chao Pruett is a 29 year old male who presents to clinic today for the following health issues:    HPI   Hypertension Follow-up      Do you check your blood pressure regularly outside of the clinic? No     Are you following a low salt diet? No    Are your blood pressures ever more than 140 on the top number (systolic) OR more   than 90 on the bottom number (diastolic), for example 140/90? No      Depression and Anxiety Followup        How are you doing with your depression since your last visit? Worsened     How are you doing with your anxiety since your last visit?  Worsened     Are you having other symptoms that might be associated with depression or anxiety? Yes:  work and money    Have you had a significant life event? Money and work    Do you have any concerns with your use of alcohol or other drugs? No     Patient is a pleasant 29-year-old male who presents the clinic with concerns for anxiety.  PCP unavailable, first encounter.  Reports he is quite anxious, has had several new stresses in life including wife recently is pregnant and job change.  Currently works for [employer], has to manage more people than he previously had, as he has racing thoughts and is able to settle down and focus.  Reports he is been assessed for anxiety depression in the past however has never been on medication.  He has no thoughts of suicide or arm to others.  He uses marijuana to relax however does not use any other illicit substances.  Seldom alcohol use.    Reports he also has a history of hypertension, previously on lisinopril.  Has not been on medications for quite a while and wonders if it is time to restart.    Social History     Tobacco Use     Smoking status: Never Smoker     Smokeless tobacco: Never Used   Substance Use Topics     Alcohol use: No     Drug use: No     PHQ 8/21/2017 6/4/2019 11/6/2019   PHQ-9 Total Score 7 8 10   Q9: Thoughts of better off dead/self-harm past 2 weeks Not at all Not at  "all Not at all     GREYSON-7 SCORE 8/21/2017 10/2/2017 11/6/2019   Total Score - - -   Total Score 15 3 17     Last PHQ-9 11/6/2019   1.  Little interest or pleasure in doing things 2   2.  Feeling down, depressed, or hopeless 3   3.  Trouble falling or staying asleep, or sleeping too much 0   4.  Feeling tired or having little energy 2   5.  Poor appetite or overeating 0   6.  Feeling bad about yourself 2   7.  Trouble concentrating 1   8.  Moving slowly or restless 0   Q9: Thoughts of better off dead/self-harm past 2 weeks 0   PHQ-9 Total Score 10   Difficulty at work, home, or with people Very difficult       Suicide Assessment Five-step Evaluation and Treatment (SAFE-T)      How many servings of fruits and vegetables do you eat daily?  0-1    On average, how many sweetened beverages do you drink each day (soda, juice, sweet tea, etc)?   1    How many days per week do you miss taking your medication? 0    Reviewed and updated as needed this visit by Provider       Review of Systems   Cardiovascular: Negative for palpitations.   Psychiatric/Behavioral: Negative for agitation, mood changes, sleep disturbance and suicidal ideas. The patient is nervous/anxious.             Objective    /86 (BP Location: Right arm, Patient Position: Chair, Cuff Size: Adult Large)   Pulse 88   Temp 99  F (37.2  C) (Oral)   Resp 16   Ht 1.727 m (5' 8\")   Wt 127 kg (280 lb)   SpO2 97%   BMI 42.57 kg/m    Body mass index is 42.57 kg/m .   Vital Signs 10/9/2018 6/4/2019 11/6/2019   Systolic 150 152 136   Diastolic 98 90 86     Physical Exam  Constitutional:       Appearance: He is obese. He is not ill-appearing.   Psychiatric:      Comments: Appears very anxious and worried.  Pressured speech however has a linear thought process.  No voicing of active hallucinations.  His thought content and judgment appears to be intact.                Assessment & Plan     1. Adjustment disorder with mixed anxiety and depressed mood  Has new " work and life stress.  Given severity of symptoms, start Lexapro and Atarax.  Brief intervention performed in clinic with breathing exercises and guided visual imagery for relaxation techniques.  We will follow-up with patient in 2 weeks for reassessment, may need titration of Lexapro at that time.  Otherwise see PCP for additional management  - EMOTIONAL / BEHAVIORAL ASSESSMENT  - EMOTIONAL / BEHAVIORAL ASSESSMENT  - escitalopram (LEXAPRO) 10 MG tablet; Take 1 tablet (10 mg) by mouth daily  Dispense: 30 tablet; Refill: 0  - hydrOXYzine (ATARAX) 25 MG tablet; Take 1 tablet (25 mg) by mouth 3 times daily as needed for anxiety  Dispense: 60 tablet; Refill: 0    2. Elevated blood pressure reading without diagnosis of hypertension  Currently prehypertensive, however is very anxious.  Recommend rechecking and monitor blood pressure.  He may be able to avoid antihypertensive medications if we are able to get his anxiety more in control.        Return in about 2 weeks (around 11/20/2019) for adjustment disorder.    Frandy Sanchez MD  [facility]    Documentation was prepared using Dragon voice recognition software. Please excuse typographical errors. Please contact me if documentation is unclear.

## 2019-11-06 NOTE — PATIENT INSTRUCTIONS
Patient Education     Understanding Adjustment Disorders    Most people have stress in their lives, and sometimes you may have more than you can handle. You may find it hard to cope with a stressful event. As a result, you may become anxious and depressed. You might even get sick. These can be symptoms of an adjustment disorder. But you don t have to suffer. Ask your healthcare provider or mental health professional for help.  Common symptoms of an adjustment disorder    Hopelessness    Frequent crying    Depressed mood    Trembling or twitching    Fast, pounding, or fluttering heartbeat (palpitations)    Health problems    Withdrawal    Anxiety or tension   What is an adjustment disorder?  Adjustment disorders sometimes occur when life gets to be too much. They often appear within 3 months of a stressful time. The symptoms vary widely. You might pretend the stressful event never happened. Or you might think about it so much you can t eat or sleep. In most cases, your feelings may seem beyond your control.  What causes it?  The events that trigger an adjustment disorder vary from person to person. Adults may be troubled by work, money, or marriage problems. Teens are more likely bothered by school or conflict with parents. They also may find it hard to cope with a divorce or sex. The death of a loved one can be especially hard to face. So can major life changes such as a move. Poverty or a lack of social skills may make matters worse.  What can be done?  Adjustment disorders can almost always be helped by therapy. You may feel relieved just to talk to someone. In some cases, only you and your therapist will meet. In others, your whole family may be involved. You might also join a group for people with this disorder. The support and concern of others can help you recover more quickly.  Date Last Reviewed: 1/1/2017 2000-2018 [employer]. [address] [facility], [place] [address]. All rights reserved.  This information is not intended as a substitute for professional medical care. Always follow your healthcare professional's instructions.

## 2019-11-07 ASSESSMENT — ANXIETY QUESTIONNAIRES: GAD7 TOTAL SCORE: 17

## 2019-11-20 ENCOUNTER — OFFICE VISIT (OUTPATIENT)
Dept: FAMILY MEDICINE | Facility: CLINIC | Age: 29
End: 2019-11-20
Payer: COMMERCIAL

## 2019-11-20 VITALS
HEART RATE: 80 BPM | OXYGEN SATURATION: 97 % | BODY MASS INDEX: 43.04 KG/M2 | DIASTOLIC BLOOD PRESSURE: 80 MMHG | WEIGHT: 284 LBS | HEIGHT: 68 IN | TEMPERATURE: 99.5 F | RESPIRATION RATE: 16 BRPM | SYSTOLIC BLOOD PRESSURE: 132 MMHG

## 2019-11-20 DIAGNOSIS — F43.23 ADJUSTMENT DISORDER WITH MIXED ANXIETY AND DEPRESSED MOOD: ICD-10-CM

## 2019-11-20 PROCEDURE — 99213 OFFICE O/P EST LOW 20 MIN: CPT | Performed by: FAMILY MEDICINE

## 2019-11-20 PROCEDURE — 96127 BRIEF EMOTIONAL/BEHAV ASSMT: CPT | Performed by: FAMILY MEDICINE

## 2019-11-20 RX ORDER — HYDROXYZINE HYDROCHLORIDE 25 MG/1
25 TABLET, FILM COATED ORAL 3 TIMES DAILY PRN
Qty: 60 TABLET | Refills: 1 | Status: SHIPPED | OUTPATIENT
Start: 2019-11-20 | End: 2020-02-19

## 2019-11-20 RX ORDER — ESCITALOPRAM OXALATE 10 MG/1
10 TABLET ORAL DAILY
Qty: 90 TABLET | Refills: 0 | Status: SHIPPED | OUTPATIENT
Start: 2019-11-20 | End: 2020-02-19

## 2019-11-20 ASSESSMENT — ANXIETY QUESTIONNAIRES
IF YOU CHECKED OFF ANY PROBLEMS ON THIS QUESTIONNAIRE, HOW DIFFICULT HAVE THESE PROBLEMS MADE IT FOR YOU TO DO YOUR WORK, TAKE CARE OF THINGS AT HOME, OR GET ALONG WITH OTHER PEOPLE: SOMEWHAT DIFFICULT
5. BEING SO RESTLESS THAT IT IS HARD TO SIT STILL: SEVERAL DAYS
6. BECOMING EASILY ANNOYED OR IRRITABLE: MORE THAN HALF THE DAYS
GAD7 TOTAL SCORE: 11
7. FEELING AFRAID AS IF SOMETHING AWFUL MIGHT HAPPEN: SEVERAL DAYS
1. FEELING NERVOUS, ANXIOUS, OR ON EDGE: SEVERAL DAYS
2. NOT BEING ABLE TO STOP OR CONTROL WORRYING: MORE THAN HALF THE DAYS
3. WORRYING TOO MUCH ABOUT DIFFERENT THINGS: MORE THAN HALF THE DAYS

## 2019-11-20 ASSESSMENT — PATIENT HEALTH QUESTIONNAIRE - PHQ9: 5. POOR APPETITE OR OVEREATING: MORE THAN HALF THE DAYS

## 2019-11-20 ASSESSMENT — MIFFLIN-ST. JEOR: SCORE: 2232.72

## 2019-11-20 ASSESSMENT — ENCOUNTER SYMPTOMS
DIARRHEA: 0
NERVOUS/ANXIOUS: 1

## 2019-11-20 NOTE — PROGRESS NOTES
"Subjective     Chao Pruett is a 29 year old male who presents to clinic today for the following health issues:    HPI   Anxiety Follow  Presents the clinic for interval follow-up regarding adjustment disorder.  Was prescribed Lexapro and Atarax.  Feels symptom has improved.  Reports that his wife also noted significant improvements.  Still has high stress at work but his symptoms now appear to be more manageable.  Finds Atarax helpful, some days required up to 3 tablets.  Voices appreciation.  No reported side effects, has chronic loose stools which has not worsened.    Social History     Tobacco Use     Smoking status: Never Smoker     Smokeless tobacco: Never Used   Substance Use Topics     Alcohol use: No     Drug use: No     GREYSON-7 SCORE 8/21/2017 10/2/2017 11/6/2019   Total Score - - -   Total Score 15 3 17     PHQ 8/21/2017 6/4/2019 11/6/2019   PHQ-9 Total Score 7 8 10   Q9: Thoughts of better off dead/self-harm past 2 weeks Not at all Not at all Not at all       How many servings of fruits and vegetables do you eat daily?  2-3    On average, how many sweetened beverages do you drink each day (soda, juice, sweet tea, etc)?   1    How many days per week do you miss taking your medication? 0    Reviewed and updated as needed this visit by Provider       Review of Systems   Gastrointestinal: Negative for diarrhea.   Psychiatric/Behavioral: The patient is nervous/anxious.             Objective    /80 (BP Location: Right arm, Patient Position: Chair, Cuff Size: Adult Large)   Pulse 80   Temp 99.5  F (37.5  C) (Oral)   Resp 16   Ht 1.727 m (5' 8\")   Wt 128.8 kg (284 lb)   SpO2 97%   BMI 43.18 kg/m    Body mass index is 43.18 kg/m .  Physical Exam  Psychiatric:         Thought Content: Thought content normal.         Judgment: Judgment normal.      Comments: Still has some pressured speech although significantly improved from a previous encounter with the patient.            Assessment & Plan     1. " Adjustment disorder with mixed anxiety and depressed mood  Improved, GREYSON-7 improved from 17 points to 11 points today.  Recommend continue current medical therapy.  Recheck in 1 month via telephone encounter.  Medications refilled.  Advised to return to clinic if symptoms get worse over the holiday periods.  - EMOTIONAL / BEHAVIORAL ASSESSMENT  - escitalopram (LEXAPRO) 10 MG tablet; Take 1 tablet (10 mg) by mouth daily  Dispense: 90 tablet; Refill: 0  - hydrOXYzine (ATARAX) 25 MG tablet; Take 1 tablet (25 mg) by mouth 3 times daily as needed for anxiety  Dispense: 60 tablet; Refill: 1       Return in about 1 month (around 12/20/2019) for anxiety .    Frandy Sanchez MD  [facility]    Documentation was prepared using Dragon voice recognition software. Please excuse typographical errors. Please contact me if documentation is unclear.

## 2019-11-21 ASSESSMENT — ANXIETY QUESTIONNAIRES: GAD7 TOTAL SCORE: 11

## 2020-02-19 ENCOUNTER — OFFICE VISIT (OUTPATIENT)
Dept: FAMILY MEDICINE | Facility: CLINIC | Age: 30
End: 2020-02-19
Payer: COMMERCIAL

## 2020-02-19 VITALS
HEART RATE: 90 BPM | HEIGHT: 69 IN | TEMPERATURE: 99 F | DIASTOLIC BLOOD PRESSURE: 82 MMHG | BODY MASS INDEX: 41.32 KG/M2 | RESPIRATION RATE: 16 BRPM | SYSTOLIC BLOOD PRESSURE: 128 MMHG | WEIGHT: 279 LBS | OXYGEN SATURATION: 97 %

## 2020-02-19 DIAGNOSIS — R23.9 SKIN SYMPTOM: ICD-10-CM

## 2020-02-19 DIAGNOSIS — F43.23 ADJUSTMENT DISORDER WITH MIXED ANXIETY AND DEPRESSED MOOD: ICD-10-CM

## 2020-02-19 DIAGNOSIS — Z00.00 ROUTINE GENERAL MEDICAL EXAMINATION AT A HEALTH CARE FACILITY: Primary | ICD-10-CM

## 2020-02-19 LAB
ALBUMIN SERPL-MCNC: 4.4 G/DL (ref 3.4–5)
ALP SERPL-CCNC: 82 U/L (ref 40–150)
ALT SERPL W P-5'-P-CCNC: 183 U/L (ref 0–70)
ANION GAP SERPL CALCULATED.3IONS-SCNC: 6 MMOL/L (ref 3–14)
AST SERPL W P-5'-P-CCNC: 62 U/L (ref 0–45)
BILIRUB SERPL-MCNC: 1 MG/DL (ref 0.2–1.3)
BUN SERPL-MCNC: 15 MG/DL (ref 7–30)
CALCIUM SERPL-MCNC: 9.9 MG/DL (ref 8.5–10.1)
CHLORIDE SERPL-SCNC: 105 MMOL/L (ref 94–109)
CHOLEST SERPL-MCNC: 184 MG/DL
CO2 SERPL-SCNC: 28 MMOL/L (ref 20–32)
CREAT SERPL-MCNC: 1.22 MG/DL (ref 0.66–1.25)
ERYTHROCYTE [DISTWIDTH] IN BLOOD BY AUTOMATED COUNT: 13.5 % (ref 10–15)
GFR SERPL CREATININE-BSD FRML MDRD: 80 ML/MIN/{1.73_M2}
GLUCOSE SERPL-MCNC: 110 MG/DL (ref 70–99)
HBA1C MFR BLD: 4.9 % (ref 0–5.6)
HCT VFR BLD AUTO: 46.3 % (ref 40–53)
HDLC SERPL-MCNC: 45 MG/DL
HGB BLD-MCNC: 16.7 G/DL (ref 13.3–17.7)
LDLC SERPL CALC-MCNC: 121 MG/DL
MCH RBC QN AUTO: 28.4 PG (ref 26.5–33)
MCHC RBC AUTO-ENTMCNC: 36.1 G/DL (ref 31.5–36.5)
MCV RBC AUTO: 79 FL (ref 78–100)
NONHDLC SERPL-MCNC: 139 MG/DL
PLATELET # BLD AUTO: 204 10E9/L (ref 150–450)
POTASSIUM SERPL-SCNC: 4.6 MMOL/L (ref 3.4–5.3)
PROT SERPL-MCNC: 7.8 G/DL (ref 6.8–8.8)
RBC # BLD AUTO: 5.88 10E12/L (ref 4.4–5.9)
SODIUM SERPL-SCNC: 139 MMOL/L (ref 133–144)
TRIGL SERPL-MCNC: 89 MG/DL
WBC # BLD AUTO: 7.7 10E9/L (ref 4–11)

## 2020-02-19 PROCEDURE — 83036 HEMOGLOBIN GLYCOSYLATED A1C: CPT | Performed by: FAMILY MEDICINE

## 2020-02-19 PROCEDURE — 80061 LIPID PANEL: CPT | Performed by: FAMILY MEDICINE

## 2020-02-19 PROCEDURE — 36415 COLL VENOUS BLD VENIPUNCTURE: CPT | Performed by: FAMILY MEDICINE

## 2020-02-19 PROCEDURE — 99395 PREV VISIT EST AGE 18-39: CPT | Performed by: FAMILY MEDICINE

## 2020-02-19 PROCEDURE — 85027 COMPLETE CBC AUTOMATED: CPT | Performed by: FAMILY MEDICINE

## 2020-02-19 PROCEDURE — 80053 COMPREHEN METABOLIC PANEL: CPT | Performed by: FAMILY MEDICINE

## 2020-02-19 PROCEDURE — 87389 HIV-1 AG W/HIV-1&-2 AB AG IA: CPT | Performed by: FAMILY MEDICINE

## 2020-02-19 RX ORDER — ESCITALOPRAM OXALATE 20 MG/1
20 TABLET ORAL DAILY
Qty: 90 TABLET | Refills: 1 | Status: SHIPPED | OUTPATIENT
Start: 2020-02-19 | End: 2020-12-09

## 2020-02-19 RX ORDER — AMMONIUM LACTATE 12 G/100G
CREAM TOPICAL 2 TIMES DAILY
Qty: 385 G | Refills: 0 | Status: SHIPPED | OUTPATIENT
Start: 2020-02-19 | End: 2020-03-16

## 2020-02-19 ASSESSMENT — ENCOUNTER SYMPTOMS
FREQUENCY: 0
FEVER: 0
WEAKNESS: 0
SORE THROAT: 0
COUGH: 0
NERVOUS/ANXIOUS: 1
PARESTHESIAS: 0
DYSURIA: 0
HEMATURIA: 0
HEMATOCHEZIA: 0
DIARRHEA: 0
ABDOMINAL PAIN: 0
HEARTBURN: 0
NAUSEA: 0
SHORTNESS OF BREATH: 0
DIZZINESS: 0
ARTHRALGIAS: 0
JOINT SWELLING: 0
CHILLS: 0
CONSTIPATION: 0
PALPITATIONS: 0
MYALGIAS: 0
EYE PAIN: 0
HEADACHES: 0

## 2020-02-19 ASSESSMENT — MIFFLIN-ST. JEOR: SCORE: 2217.98

## 2020-02-19 NOTE — PROGRESS NOTES
SUBJECTIVE:   CC: Chao Pruett is an 29 year old male who presents for preventative health visit.     Healthy Habits:     Getting at least 3 servings of Calcium per day:  Yes    Bi-annual eye exam:  NO    Dental care twice a year:  Yes    Sleep apnea or symptoms of sleep apnea:  Daytime drowsiness and Excessive snoring    Diet:  Regular (no restrictions)    Frequency of exercise:  1 day/week    Duration of exercise:  15-30 minutes    Taking medications regularly:  Yes    Medication side effects:  None    PHQ-2 Total Score: 2    Additional concerns today:  No      History of adjustment disorder with mixed anxiety and depression, on Lexapro 10 mg.  Remembers to take the med 4-5 times of the week.  Has not been using Atarax.  Finds medication helpful.  Wonders if he can have a dosage increase.  No reported side effects.    Red bumpy skin changes since a child, posterior upper arms, sides of his abdomen.  No pain or drainage.    Appears well weight management.    Today's PHQ-2 Score:   PHQ-2 ( 1999 Pfizer) 2/19/2020   Q1: Little interest or pleasure in doing things 0   Q2: Feeling down, depressed or hopeless 0   PHQ-2 Score 0   Q1: Little interest or pleasure in doing things Several days   Q2: Feeling down, depressed or hopeless Several days   PHQ-2 Score 2       Abuse: Current or Past(Physical, Sexual or Emotional)- No  Do you feel safe in your environment? Yes        Social History     Tobacco Use     Smoking status: Never Smoker     Smokeless tobacco: Never Used   Substance Use Topics     Alcohol use: No     If you drink alcohol do you typically have >3 drinks per day or >7 drinks per week? No    Alcohol Use 2/19/2020   Prescreen: >3 drinks/day or >7 drinks/week? No   Prescreen: >3 drinks/day or >7 drinks/week? -       Last PSA: No results found for: PSA    Reviewed orders with patient. Reviewed health maintenance and updated orders accordingly - Yes  Lab work is in process  Labs reviewed in EPIC    Reviewed and  "updated as needed this visit by clinical staff  Tobacco  Allergies  Meds         Reviewed and updated as needed this visit by Provider        Past Medical History:   Diagnosis Date     History of hypertension       Past Surgical History:   Procedure Laterality Date     APPENDECTOMY       FRACTURE TX, WRIST RT/LT      LT - hardware removed     HC OPEN TX TIBIAL SHAFT FX W PLATE/SCREWS W/WO CERCLAGE  2008       Review of Systems   Constitutional: Negative for chills and fever.   HENT: Negative for congestion, ear pain, hearing loss and sore throat.    Eyes: Negative for pain and visual disturbance.   Respiratory: Negative for cough and shortness of breath.    Cardiovascular: Negative for chest pain, palpitations and peripheral edema.   Gastrointestinal: Negative for abdominal pain, constipation, diarrhea, heartburn, hematochezia and nausea.   Genitourinary: Negative for discharge, dysuria, frequency, genital sores, hematuria, impotence and urgency.   Musculoskeletal: Negative for arthralgias, joint swelling and myalgias.   Skin: Negative for rash.   Neurological: Negative for dizziness, weakness, headaches and paresthesias.   Psychiatric/Behavioral: Negative for mood changes. The patient is nervous/anxious.        OBJECTIVE:   /82 (BP Location: Right arm, Patient Position: Chair, Cuff Size: Adult Large)   Pulse 90   Temp 99  F (37.2  C) (Oral)   Resp 16   Ht 1.74 m (5' 8.5\")   Wt 126.6 kg (279 lb)   SpO2 97%   BMI 41.80 kg/m      Physical Exam  Vitals signs reviewed.   Constitutional:       General: He is not in acute distress.     Appearance: Normal appearance. He is not ill-appearing.   HENT:      Head: Normocephalic and atraumatic.      Right Ear: External ear normal.      Left Ear: External ear normal.      Nose: Nose normal.      Mouth/Throat:      Mouth: Mucous membranes are moist.      Pharynx: Oropharynx is clear.   Eyes:      General: No scleral icterus.        Right eye: No discharge.         " Left eye: No discharge.      Extraocular Movements: Extraocular movements intact.      Pupils: Pupils are equal, round, and reactive to light.   Neck:      Musculoskeletal: Normal range of motion.      Vascular: No JVD.   Cardiovascular:      Rate and Rhythm: Normal rate and regular rhythm.   Pulmonary:      Effort: Pulmonary effort is normal. No respiratory distress.      Breath sounds: Normal breath sounds.   Abdominal:      General: Abdomen is flat. Bowel sounds are normal.      Palpations: Abdomen is soft.   Genitourinary:     Comments: Patient deferred exam  Musculoskeletal:         General: No swelling.   Lymphadenopathy:      Cervical: No cervical adenopathy.   Skin:     General: Skin is warm.      Capillary Refill: Capillary refill takes less than 2 seconds.      Comments: Confluent erythematous papules, nontender drainage in the posterior upper arms, flanks of abdomen.  There are some stray on the flanks of his abdomen.  Sandpaper texture.   Neurological:      General: No focal deficit present.      Mental Status: He is alert.   Psychiatric:         Mood and Affect: Mood normal.         Behavior: Behavior normal.           Diagnostic Test Results:  Labs reviewed in Epic    ASSESSMENT/PLAN:   1. Routine general medical examination at a health care facility  - CBC with platelets  - Comprehensive metabolic panel (BMP + Alb, Alk Phos, ALT, AST, Total. Bili, TP)  - Hemoglobin A1c  - Lipid panel reflex to direct LDL Fasting  - HIV Antigen Antibody Combo    2. Adjustment disorder with mixed anxiety and depressed mood  Uncontrolled, increase Lexapro to 20 mg.  Follow-up in 3 months or sooner for recheck.  - escitalopram 20 MG PO tablet; Take 1 tablet (20 mg) by mouth daily  Dispense: 90 tablet; Refill: 1    3. Skin symptom  Likely had keratosis pilaris as a child.  Trial and lactate  - ammonium lactate 12 % EX external cream; Apply topically 2 times daily  Dispense: 385 g; Refill: 0    4. BMI 40.0-44.9, adult  "(H)  - COMPREHENSIVE WEIGHT MANAGEMENT    COUNSELING:   Reviewed preventive health counseling, as reflected in patient instructions       Regular exercise       Healthy diet/nutrition       Immunizations    Declined: Influenza due to Conscientious objector            Estimated body mass index is 41.8 kg/m  as calculated from the following:    Height as of this encounter: 1.74 m (5' 8.5\").    Weight as of this encounter: 126.6 kg (279 lb).     Weight management plan: Patient referred to endocrine and/or weight management specialty Discussed healthy diet and exercise guidelines     reports that he has never smoked. He has never used smokeless tobacco.      Counseling Resources:  ATP IV Guidelines  Pooled Cohorts Equation Calculator  FRAX Risk Assessment  ICSI Preventive Guidelines  Dietary Guidelines for Americans, 2010  USDA's MyPlate  ASA Prophylaxis  Lung CA Screening    Frandy Sanchez MD  [facility]  "

## 2020-02-20 LAB — HIV 1+2 AB+HIV1 P24 AG SERPL QL IA: NONREACTIVE

## 2020-03-03 DIAGNOSIS — F43.23 ADJUSTMENT DISORDER WITH MIXED ANXIETY AND DEPRESSED MOOD: ICD-10-CM

## 2020-03-03 RX ORDER — ESCITALOPRAM OXALATE 10 MG/1
10 TABLET ORAL DAILY
Qty: 90 TABLET | Refills: 0 | OUTPATIENT
Start: 2020-03-03

## 2020-03-10 ENCOUNTER — TELEPHONE (OUTPATIENT)
Dept: SURGERY | Facility: CLINIC | Age: 30
End: 2020-03-10

## 2020-03-10 NOTE — TELEPHONE ENCOUNTER
leidym for pt asking him to fill out his questionnaires in [facility] prior to his appt tomorrow    When he signs into [facility], click the yellow health tab in the top left corner, then click questionnaires and they will be listed there    Lolis Espinoza MA

## 2020-03-16 DIAGNOSIS — R23.9 SKIN SYMPTOM: ICD-10-CM

## 2020-03-16 RX ORDER — AMMONIUM LACTATE 12 G/100G
CREAM TOPICAL 2 TIMES DAILY
Qty: 385 G | Refills: 0 | Status: SHIPPED | OUTPATIENT
Start: 2020-03-16 | End: 2020-12-09

## 2020-03-16 NOTE — TELEPHONE ENCOUNTER
Prescription approved per [facility] Refill Protocol.  Colten Archibald RN, BSN

## 2020-05-06 ENCOUNTER — VIRTUAL VISIT (OUTPATIENT)
Dept: FAMILY MEDICINE | Facility: OTHER | Age: 30
End: 2020-05-06

## 2020-05-06 NOTE — PROGRESS NOTES
"Date: 2020 07:13:19  Clinician: Ailyn Lemons  Clinician NPI: [id number]  Patient: Chao Pruett  Patient : 1990  Patient Address: [address] [place] [address]  Patient Phone: [phone]  Visit Protocol: URI  Patient Summary:  Chao is a 29 year old ( : 1990 ) male who initiated a Visit for COVID-19 (Coronavirus) evaluation and screening. When asked the question \"Please sign me up to receive news, health information and promotions. \", Chao responded \"No\".    Chao states his symptoms started 1-2 days ago.   His symptoms consist of rhinitis, a sore throat, vomiting, diarrhea, a headache, and malaise.   Symptom details     Nasal secretions: The color of his mucus is clear and green.    Sore throat: Chao reports having moderate throat pain (4-6 on a 10 point pain scale), does not have exudate on his tonsils, and can swallow liquids. He is not sure if the lymph nodes in his neck are enlarged. A rash has not appeared on the skin since the sore throat started.     Headache: He states the headache is mild (1-3 on a 10 point pain scale).      Chao denies having fever, myalgias, facial pain or pressure, cough, nasal congestion, nausea, teeth pain, ageusia, anosmia, ear pain, wheezing, and chills. He also denies taking antibiotic medication for the symptoms, having a sinus infection within the past year, and having recent facial or sinus surgery in the past 60 days. He is not experiencing dyspnea.   Precipitating events  Within the past week, Chao has not been exposed to someone with strep throat.   Pertinent COVID-19 (Coronavirus) information  Chao does not work or volunteer as healthcare worker or a  and does not work or volunteer in a healthcare facility.   He does not live with a healthcare worker.   Chao has not had a close contact with a laboratory-confirmed COVID-19 patient within 14 days of symptom onset. He also has not had a close " contact with a suspected COVID-19 patient within 14 days of symptom onset.   Pertinent medical history  Chao does not need a return to work/school note.   Weight: 280 lbs   Chao does not smoke or use smokeless tobacco.   Weight: 280 lbs    MEDICATIONS: escitalopram oxalate oral, ALLERGIES: NKDA  Clinician Response:  Dear Chao,   Dear Chao  Your symptoms show that you may have coronavirus (COVID-19). This illness can cause fever, cough and trouble breathing. Many people get a mild case and get better on their own. Some people can get very sick.  Will I be tested for COVID-19?  Because we have limited testing supplies we are not testing everyone if they are low risk. We are testing if:   You are very ill. For example, you're on chemotherapy, dialysis or home hospice care. (Contact your specialty clinic or program.)   You live in a nursing home or other long-term care facility. (Talk to your nurse manager or medical director.)   You're a health care worker. ([facility] employees Contact our employee health office for testing.)   We are performing limited curbside testing for healthcare/first responders and people with medical problems that put them at increased risk. It does not appear by the [facility] information you submitted that you meet any of these criteria. If there are medical problems that we did not know about, please repeat an [facility] visit and let us know what medical conditions you have.   How can I protect others?  Without a test, we can't know for sure that you have COVID-19. For safety, it's very important to follow these rules.  First, stay home and away from others (self-isolate) until:   You've had no fever---and no medicine that reduces fever---for 3 full days (72 hours). And...    Your other symptoms have gotten better. For example, your cough or breathing has improved. And...   At least 10 days have passed since your symptoms started.   During this time:   Don't go to work, school  or anywhere else.    Stay away from others in your home. No hugging, kissing or shaking hands.   Don't let anyone visit.   Cover your mouth and nose with a mask, tissue or wash cloth to avoid spreading germs.   Wash your hands and face often. Use soap and water.   How can I take care of myself?  1.Take Tylenol (acetaminophen) for fever or pain. If you have liver or kidney problems, ask your family doctor if it's okay to take Tylenol.   Adults can take either:    650 mg (two 325 mg pills) every 4 to 6 hours, or...   1,000 mg (two 500 mg pills) every 8 hours as needed.    Note: Don't take more than 3,000 mg in one day.  For children, check the Tylenol bottle for the right dose. The dose is based on the child's age or weight.   2.If you have other health problems (like cancer, heart failure, an organ transplant or severe kidney disease): Call your specialty clinic if you don't feel better in the next 2 days.  3.Know when to call 911: If your breathing is so bad that it keeps you from doing normal activities, call 911 or go to the emergency room. Tell them that you've been staying home and may have COVID-19.  4.Sign up for [email or web address]. We know it's scary to hear that you might have COVID-19. We want to track your symptoms to make sure you're okay over the next 2 weeks. Please look for an email from [email or web address]---this is a free, online program that we'll use to keep in touch. To sign up, follow the link in the email. Learn more at [email or web address].  Where can I get more information?  To learn more about COVID-19 and how to care for yourself at home, please visit the CDC website at https://www.cdc.gov/coronavirus/2019-ncov/about/steps-when-sick.html.  For more options for care at [facility], please visit our website at [email or web address]   If you are interested in becoming part of a [facility] trial related to COVID19 please go to  [email or web address] for information, if you qualify.     Diagnosis: Other malaise  Diagnosis ICD: R53.81

## 2020-07-08 DIAGNOSIS — Z20.822 SUSPECTED COVID-19 VIRUS INFECTION: Primary | ICD-10-CM

## 2020-07-08 PROCEDURE — U0003 INFECTIOUS AGENT DETECTION BY NUCLEIC ACID (DNA OR RNA); SEVERE ACUTE RESPIRATORY SYNDROME CORONAVIRUS 2 (SARS-COV-2) (CORONAVIRUS DISEASE [COVID-19]), AMPLIFIED PROBE TECHNIQUE, MAKING USE OF HIGH THROUGHPUT TECHNOLOGIES AS DESCRIBED BY CMS-2020-01-R: HCPCS | Performed by: FAMILY MEDICINE

## 2020-07-08 NOTE — LETTER
July 13, 2020        Chao DOM Flynn  [address] [place] [address]    This letter provides a written record that you were tested for COVID-19 on 7/8/20.       Your result was negative. This means that we didn t find the virus that causes COVID-19 in your sample. A test may show negative when you do actually have the virus. This can happen when the virus is in the early stages of infection, before you feel illness symptoms.    If you have symptoms   Stay home and away from others (self-isolate) until you meet ALL of the guidelines below:    You ve had no fever--and no medicine that reduces fever--for 3 full days (72 hours). And      Your other symptoms have gotten better. For example, your cough or breathing has improved. And     At least 10 days have passed since your symptoms started.    During this time:    Stay home. Don t go to work, school or anywhere else.     Stay in your own room, including for meals. Use your own bathroom if you can.    Stay away from others in your home. No hugging, kissing or shaking hands. No visitors.    Clean  high touch  surfaces often (doorknobs, counters, handles, etc.). Use a household cleaning spray or wipes. You can find a full list on the EPA website at www.epa.gov/pesticide-registration/list-n-disinfectants-use-against-sars-cov-2.    Cover your mouth and nose with a mask, tissue or washcloth to avoid spreading germs.    Wash your hands and face often with soap and water.    Going back to work  Check with your employer for any guidelines to follow for going back to work.    Employers: This document serves as formal notice that your employee tested negative for COVID-19, as of the testing date shown above.

## 2020-07-08 NOTE — PROGRESS NOTES
"Date: 2020 10:50:26  Clinician: Daniel Cheney  Clinician NPI: [id number]  Patient: Chao Pruett  Patient : 1990  Patient Address: [address] [place] [address]  Patient Phone: [phone]  Visit Protocol: URI  Patient Summary:  Chao is a 29 year old ( : 1990 ) male who initiated a Visit for COVID-19 (Coronavirus) evaluation and screening. When asked the question \"Please sign me up to receive news, health information and promotions. \", Chao responded \"No\".    Chao states his symptoms started gradually 3-4 days ago.   His symptoms consist of diarrhea, malaise, a headache, chills, a sore throat, and myalgia. He is experiencing difficulty breathing due to nasal congestion but he is not short of breath.   Symptom details     Sore throat: Chao reports having mild throat pain (1-3 on a 10 point pain scale), does not have exudate on his tonsils, and can swallow liquids. He is not sure if the lymph nodes in his neck are enlarged. A rash has not appeared on the skin since the sore throat started.     Headache: He states the headache is mild (1-3 on a 10 point pain scale).      Chao denies having wheezing, nausea, teeth pain, ageusia, vomiting, rhinitis, ear pain, anosmia, facial pain or pressure, fever, cough, and nasal congestion. He also denies having recent facial or sinus surgery in the past 60 days, taking antibiotic medication in the past month, and double sickening (worsening symptoms after initial improvement).   Precipitating events  Within the past week, Chao has not been exposed to someone with strep throat. He has not recently been exposed to someone with influenza. Chao has been in close contact with the following high risk individuals: pregnant women and adults 65 or older.   Pertinent COVID-19 (Coronavirus) information  In the past 14 days, Chao has not worked in a congregate living setting.   He does not work or volunteer as healthcare worker or a "  and does not work or volunteer in a healthcare facility.   Chao also has not lived in a congregate living setting in the past 14 days. He does not live with a healthcare worker.   Chao has had a close contact with a laboratory-confirmed COVID-19 patient within 14 days of symptom onset. Additional information about contact with COVID-19 (Coronavirus) patient as reported by the patient (free text): Family Friend; Exposure date 6/27/20-6/28/20; Exposure location was a common friends home.   Pertinent medical history  Chao does not need a return to work/school note.   Weight: 280 lbs   Chao does not smoke or use smokeless tobacco.   Weight: 280 lbs    MEDICATIONS: No current medications, ALLERGIES: NKDA  Clinician Response:  Dear Chao,   Your symptoms show that you may have coronavirus (COVID-19). This illness can cause fever, cough and trouble breathing. Many people get a mild case and get better on their own. Some people can get very sick.  What should I do?  We would like to test you for this virus.   1. Please call [phone] to schedule your visit. Explain that you were referred by [facility] to have a COVID-19 test. Be ready to share your [facility] visit ID number.  The following will serve as your written order for this COVID Test, ordered by me, for the indication of suspected COVID [Z20.828]: The test will be ordered in [employer], our electronic health record, after you are scheduled. It will show as ordered and authorized by Mani Hayes MD.  Order: COVID-19 (Coronavirus) PCR for SYMPTOMATIC testing from [facility].      2. When it's time for your COVID test:  Stay at least 6 feet away from others. (If someone will drive you to your test, stay in the backseat, as far away from the  as you can.)   Cover your mouth and nose with a mask, tissue or washcloth.  Go straight to the testing site. Don't make any stops on the way there or back.      3.Starting now: Stay home and away from others  "(self-isolate) until:   You've had no fever---and no medicine that reduces fever---for 3 full days (72 hours). And...   Your other symptoms have gotten better. For example, your cough or breathing has improved. And...   At least 10 days have passed since your symptoms started.       During this time, don't leave the house except for testing or medical care.   Stay in your own room, even for meals. Use your own bathroom if you can.   Stay away from others in your home. No hugging, kissing or shaking hands. No visitors.  Don't go to work, school or anywhere else.    Clean \"high touch\" surfaces often (doorknobs, counters, handles, etc.). Use a household cleaning spray or wipes. You'll find a full list of  on the EPA website: www.epa.gov/pesticide-registration/list-n-disinfectants-use-against-sars-cov-2.   Cover your mouth and nose with a mask, tissue or washcloth to avoid spreading germs.  Wash your hands and face often. Use soap and water.  Caregivers in these groups are at risk for severe illness due to COVID-19:  o People 65 years and older  o People who live in a nursing home or long-term care facility  o People with chronic disease (lung, heart, cancer, diabetes, kidney, liver, immunologic)  o People who have a weakened immune system, including those who:   Are in cancer treatment  Take medicine that weakens the immune system, such as corticosteroids  Had a bone marrow or organ transplant  Have an immune deficiency  Have poorly controlled HIV or AIDS  Are obese (body mass index of 40 or higher)  Smoke regularly   o Caregivers should wear gloves while washing dishes, handling laundry and cleaning bedrooms and bathrooms.  o Use caution when washing and drying laundry: Don't shake dirty laundry, and use the warmest water setting that you can.  o For more tips, go to www.cdc.gov/coronavirus/2019-ncov/downloads/10Things.pdf.    4.Sign up for [employer]. We know it's scary to hear that you might have COVID-19. " We want to track your symptoms to make sure you're okay over the next 2 weeks. Please look for an email from [employer]---this is a free, online program that we'll use to keep in touch. To sign up, follow the link in the email. Learn more at [email or web address]  How can I take care of myself?   Get lots of rest. Drink extra fluids (unless a doctor has told you not to).   Take Tylenol (acetaminophen) for fever or pain. If you have liver or kidney problems, ask your family doctor if it's okay to take Tylenol.   Adults can take either:    650 mg (two 325 mg pills) every 4 to 6 hours, or...   1,000 mg (two 500 mg pills) every 8 hours as needed.    Note: Don't take more than 3,000 mg in one day. Acetaminophen is found in many medicines (both prescribed and over-the-counter medicines). Read all labels to be sure you don't take too much.   For children, check the Tylenol bottle for the right dose. The dose is based on the child's age or weight.    If you have other health problems (like cancer, heart failure, an organ transplant or severe kidney disease): Call your specialty clinic if you don't feel better in the next 2 days.       Know when to call 911. Emergency warning signs include:    Trouble breathing or shortness of breath Pain or pressure in the chest that doesn't go away Feeling confused like you haven't felt before, or not being able to wake up Bluish-colored lips or face.  Where can I get more information?   [facility] -- About COVID-19: [email or web address]   CDC -- What to Do If You're Sick: www.cdc.gov/coronavirus/2019-ncov/about/steps-when-sick.html   CDC -- Ending Home Isolation: www.cdc.gov/coronavirus/2019-ncov/hcp/disposition-in-home-patients.html   CDC -- Caring for Someone: www.cdc.gov/coronavirus/2019-ncov/if-you-are-sick/care-for-someone.html   [facility] -- Interim Guidance for Hospital Discharge to Home: www.[facility]./diseases/coronavirus/hcp/hospdischarge.pdf    [facility] clinical trials (COVID-19 research studies): clinicalaffairs.[facility]-clinical-trials    Below are the COVID-19 hotlines at the [place] Department of [facility] ([facility]). Interpreters are available.    For health questions: Call 044-139-4249 or 1-457.813.7733 (7 a.m. to 7 p.m.) For questions about schools and childcare: Call 982-983-3792 or 1-590.349.9058 (7 a.m. to 7 p.m.)       [facility] Strep Test    I am sorry you are not feeling well. Based on the information provided, it is possible you could have strep throat. When left untreated, strep can spread to other areas of the body and cause a more serious infection.  A strep test is the only way to determine if a bacterial infection or a virus is causing your sore throat. This is done in a lab where a swab of the back of your throat is collected and tested for the bacteria that causes strep.  Since you chose not to use the lab order, please be seen:     In a clinic or urgent care    Within 24 hours     Call 281 or go to the emergency room if you suddenly develop a rash, are drooling or unable to swallow fluids, if you are having difficulty breathing, or feel that your throat is closing off.   Diagnosis: Pain in throat  Diagnosis ICD: R07.0  [facility] Results: [facility] Strep Test - Declined  [facility] Secondary Results: null

## 2020-07-09 LAB
SARS-COV-2 RNA SPEC QL NAA+PROBE: NOT DETECTED
SPECIMEN SOURCE: NORMAL

## 2020-11-28 ENCOUNTER — E-VISIT (OUTPATIENT)
Dept: URGENT CARE | Facility: CLINIC | Age: 30
End: 2020-11-28
Payer: COMMERCIAL

## 2020-11-28 DIAGNOSIS — Z20.822 SUSPECTED COVID-19 VIRUS INFECTION: Primary | ICD-10-CM

## 2020-11-28 PROCEDURE — 99207 PR NO CHARGE LOS: CPT | Performed by: PHYSICIAN ASSISTANT

## 2020-11-28 NOTE — PATIENT INSTRUCTIONS
Dear Chao Pruett,    Your symptoms show that you may have coronavirus (COVID-19). This illness can cause fever, cough and trouble breathing. Many people get a mild case and get better on their own. Some people can get very sick.    Will I be tested for COVID-19?  We would like to test you for Covid-19 virus. I have placed orders for this test.   To schedule: go to your [employer] home page and scroll down to the section that says  You have an appointment that needs to be scheduled  and click the large green button that says  Schedule Now  and follow the steps to find the next available openings.    If you are unable to complete these [employer] scheduling steps, please call [phone] to schedule your testing.     When it's time for your COVID test:  Stay at least 6 feet away from others. (If someone will drive you to your test, stay in the backseat, as far away from the  as you can.)  Cover your mouth and nose with a mask, tissue or washcloth.  Go straight to the testing site. Don't make any stops on the way there or back.    Starting now:     Do not go to work. Follow your usual processes for taking time away from work.  o If you receive a negative COVID-19 test result and were NOT exposed to someone with a known positive COVID-19 test, you can return to work once you're free of fever for 24 hours without fever-reducing medication and your symptoms are improving or resolved.  o If you receive a positive COVID-19 test result, return to work should be at least 10 days from symptom onset (20 days if people have immune compromise) and people should be fever-free for 24 hours without medications, and the respiratory symptoms should be improved significantly before returning to work or school  o If you were exposed to someone who has tested positive for COVID-19, you can return to work 14 days after your last contact with the positive individual, provided you do not have symptoms at all during that  "time.    During this time, don't leave the house except for testing or medical care.  o Stay in your own room, even for meals. Use your own bathroom if you can.  o Stay away from others in your home. No hugging, kissing or shaking hands. No visitors.  o Don't go to work, school or anywhere else.    Clean \"high touch\" surfaces often (doorknobs, counters, handles, etc.). Use a household cleaning spray or wipes. You'll find a full list of  on the EPA website: www.epa.gov/pesticide-registration/list-n-disinfectants-use-against-sars-cov-2.    Cover your mouth and nose with a mask, tissue or washcloth to avoid spreading germs.    Wash your hands and face often. Use soap and water.    People in these groups are at risk for severe illness due to COVID-19:  o People 65 years and older  o People who live in a nursing home or long-term care facility  o People with chronic disease (lung, heart, cancer, diabetes, kidney, liver, immunologic)  o People who have a weakened immune system, including those who:  - Are in cancer treatment  - Take medicine that weakens the immune system, such as corticosteroids  - Had a bone marrow or organ transplant  - Have an immune deficiency  - Have poorly controlled HIV or AIDS  - Are obese (body mass index of 40 or higher)  - Smoke regularly      Caregivers should wear gloves while washing dishes, handling laundry and cleaning bedrooms and bathrooms.    Use caution when washing and drying laundry: Don't shake dirty laundry, and use the warmest water setting that you can.    For more tips, go to www.cdc.gov/coronavirus/2019-ncov/downloads/10Things.pdf.    Sign up for [employer]. We know it's scary to hear that you might have COVID-19. We want to track your symptoms to make sure you're okay over the next 2 weeks. Please look for an email from [employer]--this is a free, online program that we'll use to keep in touch. To sign up, follow the link in the email you will receive. Learn more " at [email or web address]    How can I take care of myself?    Get lots of rest. Drink extra fluids (unless a doctor has told you not to)    Take Tylenol (acetaminophen) for fever or pain. If you have liver or kidney problems, ask your family doctor if it's okay to take Tylenol.  Adults can take either:    650 mg (two 325 mg pills) every 4 to 6 hours, or     1,000 mg (two 500 mg pills) every 8 hours as needed.    Note: Don't take more than 3,000 mg in one day. Acetaminophen is found in many medicines (both prescribed and over-the-counter medicines). Read all labels to be sure you don't take too much.  For children, check the Tylenol bottle for the right dose. The dose is based on the child's age or weight.    If you have other health problems (like cancer, heart failure, an organ transplant or severe kidney disease): Call your specialty clinic if you don't feel better in the next 2 days.    Know when to call 911. Emergency warning signs include:  Trouble breathing or shortness of breath  Pain or pressure in the chest that doesn't go away  Feeling confused like you haven't felt before, or not being able to wake up  Bluish-colored lips or face    Where can I get more information?    [facility] - About COVID-19: [email or web address]  CDC - What to Do If You're Sick: www.cdc.gov/coronavirus/2019-ncov/about/steps-when-sick.html

## 2020-11-30 DIAGNOSIS — Z20.822 SUSPECTED COVID-19 VIRUS INFECTION: ICD-10-CM

## 2020-11-30 PROCEDURE — U0003 INFECTIOUS AGENT DETECTION BY NUCLEIC ACID (DNA OR RNA); SEVERE ACUTE RESPIRATORY SYNDROME CORONAVIRUS 2 (SARS-COV-2) (CORONAVIRUS DISEASE [COVID-19]), AMPLIFIED PROBE TECHNIQUE, MAKING USE OF HIGH THROUGHPUT TECHNOLOGIES AS DESCRIBED BY CMS-2020-01-R: HCPCS | Performed by: PHYSICIAN ASSISTANT

## 2020-12-01 ENCOUNTER — ANCILLARY PROCEDURE (OUTPATIENT)
Dept: GENERAL RADIOLOGY | Facility: CLINIC | Age: 30
End: 2020-12-01
Attending: PHYSICIAN ASSISTANT
Payer: COMMERCIAL

## 2020-12-01 ENCOUNTER — OFFICE VISIT (OUTPATIENT)
Dept: URGENT CARE | Facility: URGENT CARE | Age: 30
End: 2020-12-01
Payer: COMMERCIAL

## 2020-12-01 VITALS
BODY MASS INDEX: 41.32 KG/M2 | HEIGHT: 69 IN | RESPIRATION RATE: 16 BRPM | WEIGHT: 279 LBS | TEMPERATURE: 98.6 F | DIASTOLIC BLOOD PRESSURE: 84 MMHG | OXYGEN SATURATION: 98 % | SYSTOLIC BLOOD PRESSURE: 126 MMHG | HEART RATE: 62 BPM

## 2020-12-01 DIAGNOSIS — J05.10 ACUTE EPIGLOTTITIS WITHOUT AIRWAY OBSTRUCTION: Primary | ICD-10-CM

## 2020-12-01 DIAGNOSIS — R09.A2 SENSATION OF FOREIGN BODY IN LARYNX: ICD-10-CM

## 2020-12-01 LAB
SARS-COV-2 RNA SPEC QL NAA+PROBE: NOT DETECTED
SPECIMEN SOURCE: NORMAL

## 2020-12-01 PROCEDURE — 70360 X-RAY EXAM OF NECK: CPT | Performed by: RADIOLOGY

## 2020-12-01 PROCEDURE — 99215 OFFICE O/P EST HI 40 MIN: CPT | Performed by: PHYSICIAN ASSISTANT

## 2020-12-01 ASSESSMENT — MIFFLIN-ST. JEOR: SCORE: 2212.98

## 2020-12-01 NOTE — PROGRESS NOTES
"SUBJECTIVE:   Chao Pruett is a 30 year old male presenting with a chief complaint of   Chief Complaint   Patient presents with     Throat Problem     x 4-5 weeks, feels like there is swelling, hard to swollow, not pain but he feel like something is there       He is an established patient of [facility].  Patient presents with complaints of feelings of FB to his larynx area.  He does not recall any one incident, states it has been there since Halloween and is always present.  It does not impair his ability to eat, drink, breathe.  He has an appointment with his PCP on 12/17, and states he came in because the feeling is \"freaking him out.\"  No history of thyroid problems.  Patient is adamant that something is stuck in his throat.      Review of Systems    Past Medical History:   Diagnosis Date     History of hypertension      Family History   Problem Relation Age of Onset     Hypertension Mother      Lipids Mother      Anxiety Disorder Mother      Depression Mother      Hypertension Father      Lipids Father      Cancer Maternal Grandmother         ovarian     Diabetes Maternal Grandfather      Lipids Paternal Grandfather      Current Outpatient Medications   Medication Sig Dispense Refill     ammonium lactate (AMLACTIN) 12 % external cream Apply topically 2 times daily 385 g 0     escitalopram 20 MG PO tablet Take 1 tablet (20 mg) by mouth daily 90 tablet 1     Social History     Tobacco Use     Smoking status: Never Smoker     Smokeless tobacco: Never Used   Substance Use Topics     Alcohol use: No       OBJECTIVE  /84 (BP Location: Right arm, Patient Position: Chair, Cuff Size: Adult Large)   Pulse 62   Temp 98.6  F (37  C) (Oral)   Resp 16   Ht 1.74 m (5' 8.5\")   Wt 126.6 kg (279 lb)   SpO2 98%   BMI 41.80 kg/m      Physical Exam  Vitals signs and nursing note reviewed.   Constitutional:       Appearance: Normal appearance. He is obese.   HENT:      Head: Normocephalic and atraumatic.      " Mouth/Throat:      Mouth: Mucous membranes are moist.      Pharynx: Oropharynx is clear.   Eyes:      Extraocular Movements: Extraocular movements intact.      Conjunctiva/sclera: Conjunctivae normal.   Neck:      Musculoskeletal: Normal range of motion and neck supple. No neck rigidity or muscular tenderness.      Comments: No thyromegally.  Cardiovascular:      Rate and Rhythm: Normal rate and regular rhythm.      Pulses: Normal pulses.   Pulmonary:      Effort: Pulmonary effort is normal.      Breath sounds: Normal breath sounds.   Lymphadenopathy:      Cervical: No cervical adenopathy.   Skin:     General: Skin is warm and dry.      Capillary Refill: Capillary refill takes less than 2 seconds.   Neurological:      General: No focal deficit present.      Mental Status: He is alert.   Psychiatric:         Mood and Affect: Mood normal.         Labs:  Results for orders placed or performed in visit on 12/01/20 (from the past 24 hour(s))   XR Neck Soft Tissue    Narrative    NECK SOFT TISSUE 12/1/2020 4:03 PM     HISTORY: Sensation of foreign body in larynx.    COMPARISON: None.      Impression    IMPRESSION: No radiopaque foreign body identified. There is mild  prominence of the epiglottic shadow on the lateral view, indeterminate  for possible epiglottic soft tissue thickening. Consider correlation  with direct inspection or neck CT, as clinically warranted.    The radiographic appearance of the base of tongue is within normal  limits. The prevertebral/retropharyngeal soft tissues are normal.  Slight levoconvex curvature of the head/upper cervical spine.  Straightening of the normal cervical lordosis. Minimal uncovertebral  spurring in the lower cervical spine.       X-Ray was done, my findings are:  NAD  Xrays personally viewed by myself.      ASSESSMENT:      ICD-10-CM    1. Sensation of foreign body in larynx  R09.89 XR Neck Soft Tissue        Medical Decision Making:    Differential Diagnosis:  Globus,  FB    Serious Comorbid Conditions:  Adult:  as above    PLAN:  CT ordered.  F/u with PCP.      Followup:    If not improving or if condition worsens, follow up with your Primary Care Provider    There are no Patient Instructions on file for this visit.

## 2020-12-02 ENCOUNTER — NURSE TRIAGE (OUTPATIENT)
Dept: NURSING | Facility: CLINIC | Age: 30
End: 2020-12-02

## 2020-12-02 ENCOUNTER — HOSPITAL ENCOUNTER (OUTPATIENT)
Dept: CT IMAGING | Facility: CLINIC | Age: 30
Discharge: HOME | End: 2020-12-02
Attending: PHYSICIAN ASSISTANT | Admitting: PHYSICIAN ASSISTANT
Payer: COMMERCIAL

## 2020-12-02 ENCOUNTER — TELEPHONE (OUTPATIENT)
Dept: FAMILY MEDICINE | Facility: CLINIC | Age: 30
End: 2020-12-02

## 2020-12-02 PROCEDURE — 250N000011 HC RX IP 250 OP 636: Performed by: PHYSICIAN ASSISTANT

## 2020-12-02 PROCEDURE — 250N000009 HC RX 250: Performed by: PHYSICIAN ASSISTANT

## 2020-12-02 PROCEDURE — 70491 CT SOFT TISSUE NECK W/DYE: CPT

## 2020-12-02 RX ORDER — IOPAMIDOL 755 MG/ML
500 INJECTION, SOLUTION INTRAVASCULAR ONCE
Status: COMPLETED | OUTPATIENT
Start: 2020-12-02 | End: 2020-12-02

## 2020-12-02 RX ADMIN — SODIUM CHLORIDE 65 ML: 9 INJECTION, SOLUTION INTRAVENOUS at 14:17

## 2020-12-02 RX ADMIN — IOPAMIDOL 80 ML: 755 INJECTION, SOLUTION INTRAVENOUS at 14:17

## 2020-12-02 NOTE — TELEPHONE ENCOUNTER
Patient calling and states was to [facility] yesterday.  Had CT scan today.  Patient states everything he is reading states he should be on a antibiotic and he has not been talked to about this.  Also states needs to know if contagious as has a infant son in the house.  Please advise.  Call him back at [phone].  Marcela Starkey RN

## 2020-12-02 NOTE — TELEPHONE ENCOUNTER
Called patient to discuss at 1744 (12/2/2020) -- there was no answer. Voicemail box is full.     If he calls back--     CT scan was read without abnormality.     If patient is having fever or worsening sore throat or new symptoms he should be re-evaluated in person      [facility]

## 2020-12-03 ENCOUNTER — TELEPHONE (OUTPATIENT)
Dept: FAMILY MEDICINE | Facility: CLINIC | Age: 30
End: 2020-12-03

## 2020-12-03 NOTE — TELEPHONE ENCOUNTER
Patient returned call.  Message from Dr. Juarez relayed.  Patient states his symptoms are worse than yesterday.  He intends to go back to Urgent Care now for re-evaluation as recommended.    Claire Montanez RN  Triage Nurse Advisor

## 2020-12-03 NOTE — TELEPHONE ENCOUNTER
Patient returning call.  Message from Dr. Juarez relayed to patient.      Buzz Juarez MD    12/2/20 5:43 PM  Note     Called patient to discuss at 1744 (12/2/2020) -- there was no answer. Voicemail box is full.    If he calls back--    CT scan was read without abnormality.    If patient is having fever or worsening sore throat or new symptoms he should be re-evaluated in person    [facility]          Patient says his symptoms have worsened since visit to Urgent Care yesterday.  Per Dr. Juarez's recommendations - advised patient to go back to Urgent Care for re-evaluation.    Claire Montanez, KIKA  Triage Nurse Advisor      Reason for Disposition    [1] Recent medical visit within 24 hours AND [2] condition/symptoms WORSE    Protocols used: RECENT MEDICAL VISIT FOR ILLNESS FOLLOW-UP CALL-A-

## 2020-12-03 NOTE — TELEPHONE ENCOUNTER
Panel Management Review      Patient has the following on his problem list:     Depression / Dysthymia review    Measure:  Needs PHQ-9 score of 4 or less during index window.  Administer PHQ-9 and if score is 5 or more, send encounter to provider for next steps.    5 - 7 month window range:     PHQ-9 SCORE 8/21/2017 6/4/2019 11/6/2019   PHQ-9 Total Score - - -   PHQ-9 Total Score 7 8 10       If PHQ-9 recheck is 5 or more, route to provider for next steps.    Patient is due for:  PHQ9      Composite cancer screening  Chart review shows that this patient is due/due soon for the following None  Summary:    Patient is due/failing the following:   PHQ9    Action needed:   Patient needs to do PHQ9.    Type of outreach:    Sent [employer] message.    Questions for provider review:    None                                                                                                                                    db     Chart routed to  .

## 2020-12-07 NOTE — PROGRESS NOTES
Pre-Visit Planning   Next 5 appointments (look out 90 days)    Dec 09, 2020 Arrive by 11:10 AM  Office Visit with Frandy Sanchez MD  [facility] [address] [facility] [id number] [phone]            Appointment Notes for this encounter:   ct fu    Questionnaires Reviewed/Assigned  No additional questionnaires are needed        Patient preferred phone number: [phone]    Unable to reach. Left voicemail. Advised patient to call clinic back at [phone].

## 2020-12-09 ENCOUNTER — OFFICE VISIT (OUTPATIENT)
Dept: FAMILY MEDICINE | Facility: CLINIC | Age: 30
End: 2020-12-09
Payer: COMMERCIAL

## 2020-12-09 VITALS
SYSTOLIC BLOOD PRESSURE: 154 MMHG | TEMPERATURE: 98.5 F | HEIGHT: 69 IN | BODY MASS INDEX: 39.4 KG/M2 | DIASTOLIC BLOOD PRESSURE: 96 MMHG | WEIGHT: 266 LBS | RESPIRATION RATE: 20 BRPM | OXYGEN SATURATION: 97 % | HEART RATE: 60 BPM

## 2020-12-09 DIAGNOSIS — R09.A2 FOREIGN BODY SENSATION IN THROAT: ICD-10-CM

## 2020-12-09 DIAGNOSIS — F32.A ANXIETY AND DEPRESSION: Primary | ICD-10-CM

## 2020-12-09 DIAGNOSIS — F41.9 ANXIETY AND DEPRESSION: Primary | ICD-10-CM

## 2020-12-09 DIAGNOSIS — R03.0 ELEVATED BLOOD PRESSURE READING WITHOUT DIAGNOSIS OF HYPERTENSION: ICD-10-CM

## 2020-12-09 PROCEDURE — 99214 OFFICE O/P EST MOD 30 MIN: CPT | Performed by: FAMILY MEDICINE

## 2020-12-09 RX ORDER — HYDROXYZINE HYDROCHLORIDE 25 MG/1
25 TABLET, FILM COATED ORAL 3 TIMES DAILY PRN
Qty: 90 TABLET | Refills: 1 | Status: SHIPPED | OUTPATIENT
Start: 2020-12-09 | End: 2021-01-13

## 2020-12-09 RX ORDER — CITALOPRAM HYDROBROMIDE 10 MG/1
TABLET ORAL
Qty: 194 TABLET | Refills: 1 | Status: SHIPPED | OUTPATIENT
Start: 2020-12-09 | End: 2021-01-13

## 2020-12-09 ASSESSMENT — ANXIETY QUESTIONNAIRES
GAD7 TOTAL SCORE: 21
3. WORRYING TOO MUCH ABOUT DIFFERENT THINGS: NEARLY EVERY DAY
6. BECOMING EASILY ANNOYED OR IRRITABLE: NEARLY EVERY DAY
1. FEELING NERVOUS, ANXIOUS, OR ON EDGE: NEARLY EVERY DAY
GAD7 TOTAL SCORE: 21
2. NOT BEING ABLE TO STOP OR CONTROL WORRYING: NEARLY EVERY DAY
7. FEELING AFRAID AS IF SOMETHING AWFUL MIGHT HAPPEN: NEARLY EVERY DAY
7. FEELING AFRAID AS IF SOMETHING AWFUL MIGHT HAPPEN: NEARLY EVERY DAY
GAD7 TOTAL SCORE: 21
5. BEING SO RESTLESS THAT IT IS HARD TO SIT STILL: NEARLY EVERY DAY
4. TROUBLE RELAXING: NEARLY EVERY DAY

## 2020-12-09 ASSESSMENT — MIFFLIN-ST. JEOR: SCORE: 2154.01

## 2020-12-09 ASSESSMENT — PATIENT HEALTH QUESTIONNAIRE - PHQ9
SUM OF ALL RESPONSES TO PHQ QUESTIONS 1-9: 16
SUM OF ALL RESPONSES TO PHQ QUESTIONS 1-9: 16
10. IF YOU CHECKED OFF ANY PROBLEMS, HOW DIFFICULT HAVE THESE PROBLEMS MADE IT FOR YOU TO DO YOUR WORK, TAKE CARE OF THINGS AT HOME, OR GET ALONG WITH OTHER PEOPLE: VERY DIFFICULT

## 2020-12-09 ASSESSMENT — ENCOUNTER SYMPTOMS
TROUBLE SWALLOWING: 0
SLEEP DISTURBANCE: 1
HEARTBURN: 0
NERVOUS/ANXIOUS: 1

## 2020-12-09 NOTE — PROGRESS NOTES
Subjective     Chao Pruett is a 30 year old male who presents to clinic today for the following health issues:    History of Present Illness       Mental Health Follow-up:  Patient presents to follow-up on Depression & Anxiety.Patient's depression since last visit has been:  Bad  The patient is not having other symptoms associated with depression.  Patient's anxiety since last visit has been:  Worse  The patient is having other symptoms associated with anxiety.  Any significant life events: other  Patient is feeling anxious or having panic attacks.  Patient has no concerns about alcohol or drug use.     Social History  Tobacco Use    Smoking status: Never Smoker    Smokeless tobacco: Never Used  Alcohol use: No  Drug use: No      Today's PHQ-9         PHQ-9 Total Score:     (P) 16   PHQ-9 Q9 Thoughts of better off dead/self-harm past 2 weeks :   (P) Not at all   Thoughts of suicide or self harm:      Self-harm Plan:        Self-harm Action:          Safety concerns for self or others:           He eats 0-1 servings of fruits and vegetables daily.He consumes 2 sweetened beverage(s) daily.He exercises with enough effort to increase his heart rate 20 to 29 minutes per day.  He exercises with enough effort to increase his heart rate 4 days per week.   He is taking medications regularly.     Answers for HPI/ROS submitted by the patient on 12/9/2020   Chronic problems general questions HPI Form  If you checked off any problems, how difficult have these problems made it for you to do your work, take care of things at home, or get along with other people?: Very difficult  PHQ9 TOTAL SCORE: 16  GREYSON 7 TOTAL SCORE: 21    Patient is a pleasant 30-year-old male with history of anxiety and depression who presents for interval follow-up.  He was also recently seen in the urgent care with a foreign body sensation in his throat, underwent an x-ray with concerns for possible epiglottitis and then had a normal CT study.  Patient  "does wonder if it is related to his mental health condition.  He also started taking some antacid medications however is unsure if there is any relief.  Last ate a meal yesterday without any difficulty swallowing solids or liquids.  There has been no fever or unintentional weight loss.  Gets concerned for possible cancer as it causes him tremendous anxiety.    Throughout 2020 of the pandemic, Elpidio has undergone different role changes at his work which has caused him some stress.  He also has a new infant at home has been an adjustment as well.  Previously was prescribed Lexapro however stopped it 6 months ago as he was lost to follow-up due to time commitments elsewhere in his life.  We did talk about starting counseling however patient states he just does not have the time to work into a schedule right now.    Dates he has an addictive personality and would like to stay away from any habitual medications.  He does endorse occasional panic attacks and has associated symptoms of difficulty getting to sleep due to thinking about the next workday.    Review of Systems   HENT: Negative for trouble swallowing.    Gastrointestinal: Negative for heartburn.   Psychiatric/Behavioral: Positive for sleep disturbance. Negative for self-injury and suicidal ideas. The patient is nervous/anxious.       Past Medical History:   Diagnosis Date     History of hypertension          Objective    BP (!) 154/96 (BP Location: Right arm, Patient Position: Sitting, Cuff Size: Adult Large)   Pulse 60   Temp 98.5  F (36.9  C) (Oral)   Resp 20   Ht 1.74 m (5' 8.5\")   Wt 120.7 kg (266 lb)   SpO2 97%   BMI 39.86 kg/m    Body mass index is 39.86 kg/m .     Physical Exam  Vitals signs reviewed.   Constitutional:       Appearance: He is not ill-appearing.   Cardiovascular:      Rate and Rhythm: Normal rate and regular rhythm.   Pulmonary:      Effort: Pulmonary effort is normal.      Breath sounds: Normal breath sounds.   Psychiatric:      " Comments: Increased rate of speech.  Anxious mood.  Linear thought process, judgment appear intact.            12-2-2020: IMPRESSION: Normal soft tissue neck CT.           Assessment & Plan     Anxiety and depression  Exacerbation of chronic problem due to medication nonadherence and lack of perceived benefit.  Discontinue Lexapro, start Celexa for management of anxiety and depression.  Atarax as needed, follow-up in a month for recheck of mental health.  - citalopram (CELEXA) 10 MG tablet; Take 1 tablet (10 mg) by mouth daily for 14 days, THEN 2 tablets (20 mg) daily.  - hydrOXYzine (ATARAX) 25 MG tablet; Take 1 tablet (25 mg) by mouth 3 times daily as needed for anxiety    Foreign body sensation in throat  We will continue to monitor.  Explained that globus sensation is a possibility however it is a diagnosis of exclusion.  He does occasionally have acid reflux and if symptoms are not improved after improvement of his mental health; we may consider referral for endoscopy for further evaluation.    Elevated blood pressure reading without diagnosis of hypertension  Likely related to problem #1.  Continue to monitor.         Return in about 1 month (around 1/9/2021) for Annual Preventative Visit..    Frandy Sanchez MD  [facility]

## 2020-12-10 ASSESSMENT — ANXIETY QUESTIONNAIRES: GAD7 TOTAL SCORE: 21

## 2020-12-10 ASSESSMENT — PATIENT HEALTH QUESTIONNAIRE - PHQ9: SUM OF ALL RESPONSES TO PHQ QUESTIONS 1-9: 16

## 2020-12-21 DIAGNOSIS — Z20.822 SUSPECTED COVID-19 VIRUS INFECTION: ICD-10-CM

## 2020-12-21 PROCEDURE — U0003 INFECTIOUS AGENT DETECTION BY NUCLEIC ACID (DNA OR RNA); SEVERE ACUTE RESPIRATORY SYNDROME CORONAVIRUS 2 (SARS-COV-2) (CORONAVIRUS DISEASE [COVID-19]), AMPLIFIED PROBE TECHNIQUE, MAKING USE OF HIGH THROUGHPUT TECHNOLOGIES AS DESCRIBED BY CMS-2020-01-R: HCPCS | Performed by: PHYSICIAN ASSISTANT

## 2020-12-22 LAB
SARS-COV-2 RNA SPEC QL NAA+PROBE: ABNORMAL
SPECIMEN SOURCE: ABNORMAL

## 2021-01-12 NOTE — PROGRESS NOTES
Pre-Visit Planning   Next 5 appointments (look out 90 days)    Jan 13, 2021  Arrive by 6:50 AM  Adult Preventative Visit with Frandy Sanchez MD  [facility] ([facility] [address] [facility] [address] [phone]        Appointment Notes for this encounter:   Reviewed - ZG.  PX and fasting    Questionnaires Reviewed/Assigned  No additional questionnaires are needed        Patient preferred phone number: [phone]    Unable to reach. Left voicemail. Advised patient to call clinic back at [phone].

## 2021-01-13 ENCOUNTER — OFFICE VISIT (OUTPATIENT)
Dept: FAMILY MEDICINE | Facility: CLINIC | Age: 31
End: 2021-01-13
Payer: COMMERCIAL

## 2021-01-13 VITALS
TEMPERATURE: 98.8 F | BODY MASS INDEX: 40.43 KG/M2 | HEIGHT: 69 IN | DIASTOLIC BLOOD PRESSURE: 96 MMHG | RESPIRATION RATE: 14 BRPM | SYSTOLIC BLOOD PRESSURE: 136 MMHG | WEIGHT: 273 LBS | OXYGEN SATURATION: 98 % | HEART RATE: 108 BPM

## 2021-01-13 DIAGNOSIS — F41.9 ANXIETY AND DEPRESSION: ICD-10-CM

## 2021-01-13 DIAGNOSIS — Z00.00 ANNUAL PHYSICAL EXAM: Primary | ICD-10-CM

## 2021-01-13 DIAGNOSIS — F32.A ANXIETY AND DEPRESSION: ICD-10-CM

## 2021-01-13 DIAGNOSIS — Z11.59 NEED FOR HEPATITIS C SCREENING TEST: ICD-10-CM

## 2021-01-13 LAB
ALBUMIN SERPL-MCNC: 4.2 G/DL (ref 3.4–5)
ALP SERPL-CCNC: 91 U/L (ref 40–150)
ALT SERPL W P-5'-P-CCNC: 117 U/L (ref 0–70)
ANION GAP SERPL CALCULATED.3IONS-SCNC: 4 MMOL/L (ref 3–14)
AST SERPL W P-5'-P-CCNC: 40 U/L (ref 0–45)
BILIRUB SERPL-MCNC: 0.8 MG/DL (ref 0.2–1.3)
BUN SERPL-MCNC: 21 MG/DL (ref 7–30)
CALCIUM SERPL-MCNC: 9.6 MG/DL (ref 8.5–10.1)
CHLORIDE SERPL-SCNC: 109 MMOL/L (ref 94–109)
CHOLEST SERPL-MCNC: 166 MG/DL
CO2 SERPL-SCNC: 29 MMOL/L (ref 20–32)
CREAT SERPL-MCNC: 1.24 MG/DL (ref 0.66–1.25)
ERYTHROCYTE [DISTWIDTH] IN BLOOD BY AUTOMATED COUNT: 13.2 % (ref 10–15)
GFR SERPL CREATININE-BSD FRML MDRD: 78 ML/MIN/{1.73_M2}
GLUCOSE SERPL-MCNC: 102 MG/DL (ref 70–99)
HBA1C MFR BLD: 5 % (ref 0–5.6)
HCT VFR BLD AUTO: 46.2 % (ref 40–53)
HCV AB SERPL QL IA: NONREACTIVE
HDLC SERPL-MCNC: 48 MG/DL
HGB BLD-MCNC: 16.2 G/DL (ref 13.3–17.7)
LDLC SERPL CALC-MCNC: 106 MG/DL
MCH RBC QN AUTO: 27.8 PG (ref 26.5–33)
MCHC RBC AUTO-ENTMCNC: 35.1 G/DL (ref 31.5–36.5)
MCV RBC AUTO: 79 FL (ref 78–100)
NONHDLC SERPL-MCNC: 118 MG/DL
PLATELET # BLD AUTO: 210 10E9/L (ref 150–450)
POTASSIUM SERPL-SCNC: 4.9 MMOL/L (ref 3.4–5.3)
PROT SERPL-MCNC: 7.8 G/DL (ref 6.8–8.8)
RBC # BLD AUTO: 5.82 10E12/L (ref 4.4–5.9)
SODIUM SERPL-SCNC: 142 MMOL/L (ref 133–144)
TRIGL SERPL-MCNC: 61 MG/DL
WBC # BLD AUTO: 7.9 10E9/L (ref 4–11)

## 2021-01-13 PROCEDURE — 80061 LIPID PANEL: CPT | Performed by: FAMILY MEDICINE

## 2021-01-13 PROCEDURE — 99395 PREV VISIT EST AGE 18-39: CPT | Performed by: FAMILY MEDICINE

## 2021-01-13 PROCEDURE — 86803 HEPATITIS C AB TEST: CPT | Performed by: FAMILY MEDICINE

## 2021-01-13 PROCEDURE — 83036 HEMOGLOBIN GLYCOSYLATED A1C: CPT | Performed by: FAMILY MEDICINE

## 2021-01-13 PROCEDURE — 36415 COLL VENOUS BLD VENIPUNCTURE: CPT | Performed by: FAMILY MEDICINE

## 2021-01-13 PROCEDURE — 80053 COMPREHEN METABOLIC PANEL: CPT | Performed by: FAMILY MEDICINE

## 2021-01-13 PROCEDURE — 85027 COMPLETE CBC AUTOMATED: CPT | Performed by: FAMILY MEDICINE

## 2021-01-13 RX ORDER — CITALOPRAM HYDROBROMIDE 10 MG/1
20 TABLET ORAL DAILY
Qty: 90 TABLET | Refills: 0 | COMMUNITY
Start: 2021-01-13 | End: 2021-03-11

## 2021-01-13 RX ORDER — HYDROXYZINE HYDROCHLORIDE 25 MG/1
25 TABLET, FILM COATED ORAL 3 TIMES DAILY PRN
Qty: 90 TABLET | Refills: 3 | Status: SHIPPED | OUTPATIENT
Start: 2021-01-13 | End: 2022-02-15

## 2021-01-13 ASSESSMENT — ENCOUNTER SYMPTOMS
MYALGIAS: 0
HEARTBURN: 0
CONSTIPATION: 0
HEADACHES: 0
SORE THROAT: 0
SHORTNESS OF BREATH: 0
NAUSEA: 0
DIZZINESS: 0
COUGH: 0
ABDOMINAL PAIN: 0
EYE PAIN: 0
NERVOUS/ANXIOUS: 1
WEAKNESS: 0
HEMATURIA: 0
FEVER: 0
FREQUENCY: 0
JOINT SWELLING: 0
DIARRHEA: 0
CHILLS: 0
DYSURIA: 0
PALPITATIONS: 0
ARTHRALGIAS: 0
HEMATOCHEZIA: 0
PARESTHESIAS: 0

## 2021-01-13 ASSESSMENT — ANXIETY QUESTIONNAIRES
7. FEELING AFRAID AS IF SOMETHING AWFUL MIGHT HAPPEN: NOT AT ALL
1. FEELING NERVOUS, ANXIOUS, OR ON EDGE: SEVERAL DAYS
GAD7 TOTAL SCORE: 10
5. BEING SO RESTLESS THAT IT IS HARD TO SIT STILL: MORE THAN HALF THE DAYS
3. WORRYING TOO MUCH ABOUT DIFFERENT THINGS: MORE THAN HALF THE DAYS
6. BECOMING EASILY ANNOYED OR IRRITABLE: SEVERAL DAYS
IF YOU CHECKED OFF ANY PROBLEMS ON THIS QUESTIONNAIRE, HOW DIFFICULT HAVE THESE PROBLEMS MADE IT FOR YOU TO DO YOUR WORK, TAKE CARE OF THINGS AT HOME, OR GET ALONG WITH OTHER PEOPLE: SOMEWHAT DIFFICULT
2. NOT BEING ABLE TO STOP OR CONTROL WORRYING: MORE THAN HALF THE DAYS

## 2021-01-13 ASSESSMENT — PATIENT HEALTH QUESTIONNAIRE - PHQ9
SUM OF ALL RESPONSES TO PHQ QUESTIONS 1-9: 4
5. POOR APPETITE OR OVEREATING: MORE THAN HALF THE DAYS

## 2021-01-13 ASSESSMENT — MIFFLIN-ST. JEOR: SCORE: 2185.76

## 2021-01-13 NOTE — PROGRESS NOTES
SUBJECTIVE:   CC: Chao Pruett is an 30 year old male who presents for preventative health visit.     No future appointments.  Appointment Notes for this encounter:   Reviewed - TAYLERG.  PX and fasting    There are no preventive care reminders to display for this patient.  Health Maintenance addressed:  PHQ2 and PHQ9    MyChart Status:  Active and Using  Healthy Habits:     Getting at least 3 servings of Calcium per day:  Yes    Bi-annual eye exam:  NO    Dental care twice a year:  Yes    Sleep apnea or symptoms of sleep apnea:  Excessive snoring    Diet:  Regular (no restrictions)    Frequency of exercise:  1 day/week    Duration of exercise:  15-30 minutes    Taking medications regularly:  Yes    Medication side effects:  None    PHQ-2 Total Score: 2    Additional concerns today:  No        Depression Followup    How are you doing with your depression since your last visit? Improved     Are you having other symptoms that might be associated with depression? No    Have you had a significant life event?  No     Are you feeling anxious or having panic attacks?   No    Do you have any concerns with your use of alcohol or other drugs? No    Social History     Tobacco Use     Smoking status: Never Smoker     Smokeless tobacco: Never Used   Substance Use Topics     Alcohol use: No     Drug use: No     PHQ 11/6/2019 12/9/2020 1/13/2021   PHQ-9 Total Score 10 16 4   Q9: Thoughts of better off dead/self-harm past 2 weeks Not at all Not at all Not at all     GREYSON-7 SCORE 11/20/2019 12/9/2020 1/13/2021   Total Score - - -   Total Score - 21 (severe anxiety) -   Total Score 11 21 10           Suicide Assessment Five-step Evaluation and Treatment (SAFE-T)      Today's PHQ-2 Score:   PHQ-2 ( 1999 Pf[employer]) 1/13/2021   Q1: Little interest or pleasure in doing things 1   Q2: Feeling down, depressed or hopeless 1   PHQ-2 Score 2   Q1: Little interest or pleasure in doing things Several days   Q2: Feeling down, depressed or hopeless  Several days   PHQ-2 Score 2       Abuse: Current or Past(Physical, Sexual or Emotional)- No  Do you feel safe in your environment? Yes        Social History     Tobacco Use     Smoking status: Never Smoker     Smokeless tobacco: Never Used   Substance Use Topics     Alcohol use: No       Alcohol Use 1/13/2021   Prescreen: >3 drinks/day or >7 drinks/week? No   Prescreen: >3 drinks/day or >7 drinks/week? -       Last PSA: No results found for: PSA    Reviewed orders with patient. Reviewed health maintenance and updated orders accordingly - Yes  Lab work is in process  Labs reviewed in EPIC    Reviewed and updated as needed this visit by clinical staff  Tobacco  Allergies               Reviewed and updated as needed this visit by Provider                Past Medical History:   Diagnosis Date     History of hypertension       Past Surgical History:   Procedure Laterality Date     APPENDECTOMY       FRACTURE TX, WRIST RT/LT      LT - hardware removed     HC OPEN TX TIBIAL SHAFT FX W PLATE/SCREWS W/WO CERCLAGE  2008       Review of Systems   Constitutional: Negative for chills and fever.   HENT: Negative for congestion, ear pain, hearing loss and sore throat.    Eyes: Negative for pain and visual disturbance.   Respiratory: Negative for cough and shortness of breath.    Cardiovascular: Negative for chest pain, palpitations and peripheral edema.   Gastrointestinal: Negative for abdominal pain, constipation, diarrhea, heartburn, hematochezia and nausea.   Genitourinary: Negative for discharge, dysuria, frequency, genital sores, hematuria, impotence and urgency.   Musculoskeletal: Negative for arthralgias, joint swelling and myalgias.   Skin: Negative for rash.   Neurological: Negative for dizziness, weakness, headaches and paresthesias.   Psychiatric/Behavioral: Negative for mood changes. The patient is nervous/anxious.        OBJECTIVE:   BP (!) 136/96 (Cuff Size: Adult Large)   Pulse 108   Temp 98.8  F (37.1  C)    "Resp 14   Ht 1.74 m (5' 8.5\")   Wt 123.8 kg (273 lb)   SpO2 98%   BMI 40.91 kg/m      Physical Exam  Vitals signs reviewed.   Constitutional:       General: He is not in acute distress.     Appearance: Normal appearance. He is not ill-appearing.   HENT:      Head: Normocephalic and atraumatic.      Right Ear: External ear normal.      Left Ear: External ear normal.      Nose: Nose normal.      Mouth/Throat:      Mouth: Mucous membranes are moist.      Pharynx: Oropharynx is clear.   Eyes:      General: No scleral icterus.        Right eye: No discharge.         Left eye: No discharge.      Extraocular Movements: Extraocular movements intact.      Pupils: Pupils are equal, round, and reactive to light.   Neck:      Musculoskeletal: Normal range of motion.      Vascular: No JVD.   Cardiovascular:      Rate and Rhythm: Normal rate and regular rhythm.   Pulmonary:      Effort: Pulmonary effort is normal. No respiratory distress.      Breath sounds: Normal breath sounds.   Abdominal:      General: Abdomen is flat. Bowel sounds are normal.      Palpations: Abdomen is soft.      Comments: Abdominal striae   Musculoskeletal:         General: No swelling.   Lymphadenopathy:      Cervical: No cervical adenopathy.   Skin:     General: Skin is warm.      Capillary Refill: Capillary refill takes less than 2 seconds.   Neurological:      General: No focal deficit present.      Mental Status: He is alert.   Psychiatric:         Mood and Affect: Mood normal.         Behavior: Behavior normal.         Diagnostic Test Results:  Labs reviewed in Epic    ASSESSMENT/PLAN:   1. Annual physical exam  If LFTs continue to be elevated, likely need right upper quadrant ultrasound; possible nonalcoholic fatty liver disease.  - Comprehensive metabolic panel (BMP + Alb, Alk Phos, ALT, AST, Total. Bili, TP)  - CBC with platelets  - Hemoglobin A1c  - Lipid panel reflex to direct LDL Fasting    2. Need for hepatitis C screening test  - " "Hepatitis C Screen Reflex to HCV RNA Quant and Genotype    3. Anxiety and depression  - hydrOXYzine (ATARAX) 25 MG tablet; Take 1 tablet (25 mg) by mouth 3 times daily as needed for anxiety  Dispense: 90 tablet; Refill: 3  - citalopram (CELEXA) 10 MG tablet; Take 2 tablets (20 mg) by mouth daily  Dispense: 90 tablet; Refill: 0    Patient has been advised of split billing requirements and indicates understanding: No  COUNSELING:   Reviewed preventive health counseling, as reflected in patient instructions       Regular exercise       Healthy diet/nutrition    Estimated body mass index is 40.91 kg/m  as calculated from the following:    Height as of this encounter: 1.74 m (5' 8.5\").    Weight as of this encounter: 123.8 kg (273 lb).     Weight management plan: Discussed healthy diet and exercise guidelines    He reports that he has never smoked. He has never used smokeless tobacco.      Counseling Resources:  ATP IV Guidelines  Pooled Cohorts Equation Calculator  FRAX Risk Assessment  ICSI Preventive Guidelines  Dietary Guidelines for Americans, 2010  USDA's MyPlate  ASA Prophylaxis  Lung CA Screening    Frandy Sanchez MD  [facility]  "

## 2021-01-14 ASSESSMENT — ANXIETY QUESTIONNAIRES: GAD7 TOTAL SCORE: 10

## 2021-03-11 DIAGNOSIS — F32.A ANXIETY AND DEPRESSION: ICD-10-CM

## 2021-03-11 DIAGNOSIS — F41.9 ANXIETY AND DEPRESSION: ICD-10-CM

## 2021-03-11 RX ORDER — CITALOPRAM HYDROBROMIDE 20 MG/1
20 TABLET ORAL DAILY
Qty: 60 TABLET | Refills: 2 | Status: SHIPPED | OUTPATIENT
Start: 2021-03-11 | End: 2021-06-08

## 2021-03-11 NOTE — TELEPHONE ENCOUNTER
Routing refill request to provider for review/approval because:  Medication is reported/historical      Incoming fax is sent for incorrect dosage of Citalopram (Pharmacy requests 20 mg tablets, per Epic this has not been ordered since 2017 that I can find). Appears however that he may be due  for refill of Citalopram.   Current medication on med list pended, but is historical. Or may have been discontinued.     Discontinued None Frandy Sanchez MD 1/13/21 0833         Claire Thibodeaux R.N.

## 2021-04-12 ENCOUNTER — E-VISIT (OUTPATIENT)
Dept: URGENT CARE | Facility: CLINIC | Age: 31
End: 2021-04-12
Payer: COMMERCIAL

## 2021-04-12 DIAGNOSIS — R05.9 COUGH: ICD-10-CM

## 2021-04-12 DIAGNOSIS — R05.9 COUGH: Primary | ICD-10-CM

## 2021-04-12 DIAGNOSIS — Z20.822 SUSPECTED COVID-19 VIRUS INFECTION: ICD-10-CM

## 2021-04-12 LAB
LABORATORY COMMENT REPORT: NORMAL
SARS-COV-2 RNA RESP QL NAA+PROBE: NEGATIVE
SARS-COV-2 RNA RESP QL NAA+PROBE: NORMAL
SPECIMEN SOURCE: NORMAL
SPECIMEN SOURCE: NORMAL

## 2021-04-12 PROCEDURE — 99421 OL DIG E/M SVC 5-10 MIN: CPT | Mod: 95 | Performed by: PHYSICIAN ASSISTANT

## 2021-04-12 PROCEDURE — 87635 SARS-COV-2 COVID-19 AMP PRB: CPT | Performed by: PHYSICIAN ASSISTANT

## 2021-04-12 RX ORDER — IPRATROPIUM BROMIDE 42 UG/1
2 SPRAY, METERED NASAL 4 TIMES DAILY
Qty: 15 ML | Refills: 0 | Status: SHIPPED | OUTPATIENT
Start: 2021-04-12 | End: 2022-03-31

## 2021-04-12 NOTE — PATIENT INSTRUCTIONS
Dear Chao Pruett,    Your symptoms show that you may have coronavirus (COVID-19). This illness can cause fever, cough and trouble breathing. Many people get a mild case and get better on their own. Some people can get very sick.    Will I be tested for COVID-19?  We would like to test you for Covid-19 virus. I have placed orders for this test.     To schedule: go to your [employer] home page and scroll down to the section that says  You have an appointment that needs to be scheduled  and click the large green button that says  Schedule Now  and follow the steps to find the next available openings.    If you are unable to complete these [facility] scheduling steps, please call [phone] to schedule your testing.     Return to work/school/ guidance:  Please let your workplace manager and staffing office know when your quarantine ends     We can t give you an exact date as it depends on the above. You can calculate this on your own or work with your manager/staffing office to calculate this. (For example if you were exposed on 10/4, you would have to quarantine for 14 full days. That would be through 10/18. You could return on 10/19.)      If you receive a positive COVID-19 test result, follow the guidance of the those who are giving you the results. Usually the return to work is 10 (or in some cases 20 days from symptom onset.) If you work at [facility], you must also be cleared by Employee Occupational Health and Safety to return to work.        If you receive a negative COVID-19 test result and did not have a high risk exposure to someone with a known positive COVID-19 test, you can return to work once you're free of fever for 24 hours without fever-reducing medication and your symptoms are improving or resolved.      If you receive a negative COVID-19 test and If you had a high risk exposure to someone who has tested positive for COVID-19 then you can return to work 14 days after your last contact  with the positive individual    Note: If you have ongoing exposure to the covid positive person, this quarantine period may be more than 14 days. (For example, if you are continued to be exposed to your child who tested positive and cannot isolate from them, then the quarantine of 7-14 days can't start until your child is no longer contagious. This is typically 10 days from onset of the child's symptoms. So the total duration may be 17-24 days in this case.)    Sign up for [employer].   We know it's scary to hear that you might have COVID-19. We want to track your symptoms to make sure you're okay over the next 2 weeks. Please look for an email from [employer]--this is a free, online program that we'll use to keep in touch. To sign up, follow the link in the email you will receive. Learn more at [email or web address]    How can I take care of myself?    Get lots of rest. Drink extra fluids (unless a doctor has told you not to)    Take Tylenol (acetaminophen) or ibuprofen for fever or pain. If you have liver or kidney problems, ask your family doctor if it's okay to take Tylenol o ibuprofen    If you have other health problems (like cancer, heart failure, an organ transplant or severe kidney disease): Call your specialty clinic if you don't feel better in the next 2 days.    Know when to call 911. Emergency warning signs include:  o Trouble breathing or shortness of breath  o Pain or pressure in the chest that doesn't go away  o Feeling confused like you haven't felt before, or not being able to wake up  o Bluish-colored lips or face    Where can I get more information?  [facility] - About COVID-19:   [email or web address]    CDC - What to Do If You're Sick:   www.cdc.gov/coronavirus/2019-ncov/about/steps-when-sick.html    April 12, 2021  RE:  Chao Pruett                                                                                                                  [address] [place] [address]      To whom it may concern:    I evaluated Chao Pruett on April 12, 2021. Chao Pruett should be excused from work/school.     They should let their workplace manager and staffing office know when their quarantine ends.    We can not give an exact date as it depends on the information below. They can calculate this on their own or work with their manager/staffing office to calculate this. (For example if they were exposed on 10/04, they would have to quarantine for 14 full days. That would be through 10/18. They could return on 10/19.)    Quarantine Guidelines:      If patient receives a positive COVID-19 test result, they should follow the guidance of those who are giving the results. Usually the return to work is 10 (or in some cases 20 days from symptom onset.) If they work at [employer], they must be cleared by Employee Occupational Health and Safety to return to work.        If patient receives a negative COVID-19 test result and did not have a high risk exposure to someone with a known positive COVID-19 test, they can return to work once they're free of fever for 24 hours without fever-reducing medication and their symptoms are improving or resolved.      If patient receives a negative COVID-19 test and if they had a high risk exposure to someone who has tested positive for COVID-19 then they can return to work 14 days after their last contact with the positive individual    Note: If there is ongoing exposure to the covid positive person, this quarantine period may be longer than 14 days. (For example, if they are continually exposed to their child, who tested positive and cannot isolate from them, then the quarantine of 7-14 days can't start until their child is no longer contagious. This is typically 10 days from onset to the child's symptoms. So the total duration may be 17-24 days in this case.)     Sincerely,  Lyn Lao PA-C

## 2021-04-16 ENCOUNTER — E-VISIT (OUTPATIENT)
Dept: URGENT CARE | Facility: URGENT CARE | Age: 31
End: 2021-04-16
Payer: COMMERCIAL

## 2021-04-16 DIAGNOSIS — Z20.822 SUSPECTED COVID-19 VIRUS INFECTION: ICD-10-CM

## 2021-04-16 DIAGNOSIS — J02.9 SORE THROAT: ICD-10-CM

## 2021-04-16 LAB
DEPRECATED S PYO AG THROAT QL EIA: NEGATIVE
LABORATORY COMMENT REPORT: NORMAL
SARS-COV-2 RNA RESP QL NAA+PROBE: NEGATIVE
SARS-COV-2 RNA RESP QL NAA+PROBE: NORMAL
SPECIMEN SOURCE: NORMAL
STREP GROUP A PCR: NOT DETECTED

## 2021-04-16 PROCEDURE — U0003 INFECTIOUS AGENT DETECTION BY NUCLEIC ACID (DNA OR RNA); SEVERE ACUTE RESPIRATORY SYNDROME CORONAVIRUS 2 (SARS-COV-2) (CORONAVIRUS DISEASE [COVID-19]), AMPLIFIED PROBE TECHNIQUE, MAKING USE OF HIGH THROUGHPUT TECHNOLOGIES AS DESCRIBED BY CMS-2020-01-R: HCPCS | Performed by: PHYSICIAN ASSISTANT

## 2021-04-16 PROCEDURE — 99N1174 PR STATISTIC STREP A RAPID: Performed by: PHYSICIAN ASSISTANT

## 2021-04-16 PROCEDURE — 87651 STREP A DNA AMP PROBE: CPT | Performed by: PHYSICIAN ASSISTANT

## 2021-04-16 PROCEDURE — 99421 OL DIG E/M SVC 5-10 MIN: CPT | Performed by: PHYSICIAN ASSISTANT

## 2021-04-16 PROCEDURE — U0005 INFEC AGEN DETEC AMPLI PROBE: HCPCS | Performed by: PHYSICIAN ASSISTANT

## 2021-04-16 NOTE — LETTER
April 16, 2021      Chao Pruett  [address] [place] [address]        Dear Ashley,    We are writing to inform you of your test results.    Your test results fall within the expected range(s) and negative strep test. We will call you with strep PCR results only if positive.    Enclosed is a copy of these result.    Resulted Orders   Streptococcus A Rapid Scr w Reflx to PCR   Result Value Ref Range    Strep Specimen Description Throat     Streptococcus Group A Rapid Screen Negative NEG^Negative      Comment:      No Group A streptococcal antigen detected by immunoassay. Confirmatory testing   in progress.         If you have any questions or concerns, please call the clinic at the number listed above.   Thank you for choosing [facility].      Sincerely,      Arnulfo Madrid PA-C

## 2021-04-16 NOTE — PATIENT INSTRUCTIONS
Dear Chao Pruett,    Your symptoms show that you may have coronavirus (COVID-19). This illness can cause fever, cough and trouble breathing. Many people get a mild case and get better on their own. Some people can get very sick.    Because you also reported sore throat I would like to also test you for Strep Throat to determine if we need to treat you for that as well.    What should I do?  We would like to test you for Covid-19 virus and Strep Throat. I have placed orders for these tests.   To schedule: go to your [employer] home page and scroll down to the section that says  You have an appointment that needs to be scheduled  and click the large green button that says  Schedule Now  and follow the steps to find the next available openings. It is important that when you are asked what the reason for your appointment is that you mention you need BOTH Covid and Strep tests.    If you are unable to complete these [employer] scheduling steps, please call [phone] to schedule your testing.     Return to work/school/ guidance:   Please let your workplace manager and staffing office know when your quarantine ends     We can t give you an exact date as it depends on the above. You can calculate this on your own or work with your manager/staffing office to calculate this. (For example if you were exposed on 10/4, you would have to quarantine for 14 full days. That would be through 10/18. You could return on 10/19.)      If you receive a positive COVID-19 test result, follow the guidance of the those who are giving you the results. Usually the return to work is 10 (or in some cases 20 days from symptom onset.) If you work at [employer], you must also be cleared by Employee Occupational Health and Safety to return to work.        If you receive a negative COVID-19 test result and did not have a high risk exposure to someone with a known positive COVID-19 test, you can return to work once you're free of  fever for 24 hours without fever-reducing medication and your symptoms are improving or resolved.      If you receive a negative COVID-19 test and If you had a high risk exposure to someone who has tested positive for COVID-19 then you can return to work 14 days after your last contact with the positive individual    Note: If you have ongoing exposure to the covid positive person, this quarantine period may be more than 14 days. (For example, if you are continued to be exposed to your child who tested positive and cannot isolate from them, then the quarantine of 7-14 days can't start until your child is no longer contagious. This is typically 10 days from onset of the child's symptoms. So the total duration may be 17-24 days in this case.)    Sign up for [employer].   We know it's scary to hear that you might have COVID-19. We want to track your symptoms to make sure you're okay over the next 2 weeks. Please look for an email from [employer]--this is a free, online program that we'll use to keep in touch. To sign up, follow the link in the email you will receive. Learn more at [email or web address]    How can I take care of myself?    Get lots of rest. Drink extra fluids (unless a doctor has told you not to)    Take Tylenol (acetaminophen) or ibuprofen for fever or pain. If you have liver or kidney problems, ask your family doctor if it's okay to take Tylenol o ibuprofen    If you have other health problems (like cancer, heart failure, an organ transplant or severe kidney disease): Call your specialty clinic if you don't feel better in the next 2 days.    Know when to call 911. Emergency warning signs include:  o Trouble breathing or shortness of breath  o Pain or pressure in the chest that doesn't go away  o Feeling confused like you haven't felt before, or not being able to wake up  o Bluish-colored lips or face    Where can I get more information?  [facility] - About COVID-19:    [email or web address]    CDC - What to Do If You're Sick:   www.cdc.gov/coronavirus/2019-ncov/about/steps-when-sick.html    April 16, 2021  RE:  Chao Pruett                                                                                                                  [facility] [place] [address]      To whom it may concern:    I evaluated Chao Pruett on April 16, 2021. Chao Pruett should be excused from work/school.     They should let their workplace manager and staffing office know when their quarantine ends.    We can not give an exact date as it depends on the information below. They can calculate this on their own or work with their manager/staffing office to calculate this. (For example if they were exposed on 10/04, they would have to quarantine for 14 full days. That would be through 10/18. They could return on 10/19.)    Quarantine Guidelines:      If patient receives a positive COVID-19 test result, they should follow the guidance of those who are giving the results. Usually the return to work is 10 (or in some cases 20 days from symptom onset.) If they work at [facility] [employer], they must be cleared by Employee Occupational Health and Safety to return to work.        If patient receives a negative COVID-19 test result and did not have a high risk exposure to someone with a known positive COVID-19 test, they can return to work once they're free of fever for 24 hours without fever-reducing medication and their symptoms are improving or resolved.      If patient receives a negative COVID-19 test and if they had a high risk exposure to someone who has tested positive for COVID-19 then they can return to work 14 days after their last contact with the positive individual    Note: If there is ongoing exposure to the covid positive person, this quarantine period may be longer than 14 days. (For example, if they are continually exposed to their child, who tested positive  and cannot isolate from them, then the quarantine of 7-14 days can't start until their child is no longer contagious. This is typically 10 days from onset to the child's symptoms. So the total duration may be 17-24 days in this case.)     Sincerely,  Arnulfo Madrid PA-C

## 2021-05-20 ENCOUNTER — IMMUNIZATION (OUTPATIENT)
Dept: NURSING | Facility: CLINIC | Age: 31
End: 2021-05-20
Payer: COMMERCIAL

## 2021-05-20 PROCEDURE — 0001A PR COVID VAC PFIZER DIL RECON 30 MCG/0.3 ML IM: CPT

## 2021-05-20 PROCEDURE — 91300 PR COVID VAC PFIZER DIL RECON 30 MCG/0.3 ML IM: CPT

## 2021-06-07 DIAGNOSIS — F41.9 ANXIETY AND DEPRESSION: ICD-10-CM

## 2021-06-07 DIAGNOSIS — F32.A ANXIETY AND DEPRESSION: ICD-10-CM

## 2021-06-08 RX ORDER — CITALOPRAM HYDROBROMIDE 20 MG/1
20 TABLET ORAL DAILY
Qty: 60 TABLET | Refills: 4 | Status: SHIPPED | OUTPATIENT
Start: 2021-06-08 | End: 2022-03-31

## 2021-06-08 NOTE — TELEPHONE ENCOUNTER
Prescription approved per [facility] Refill Protocol.  Aleksandra Chen RN

## 2021-06-10 ENCOUNTER — IMMUNIZATION (OUTPATIENT)
Dept: NURSING | Facility: CLINIC | Age: 31
End: 2021-06-10
Attending: INTERNAL MEDICINE
Payer: COMMERCIAL

## 2021-06-10 PROCEDURE — 91300 PR COVID VAC PFIZER DIL RECON 30 MCG/0.3 ML IM: CPT

## 2021-06-10 PROCEDURE — 0002A PR COVID VAC PFIZER DIL RECON 30 MCG/0.3 ML IM: CPT

## 2021-06-14 ENCOUNTER — TELEPHONE (OUTPATIENT)
Dept: FAMILY MEDICINE | Facility: CLINIC | Age: 31
End: 2021-06-14

## 2021-06-14 NOTE — TELEPHONE ENCOUNTER
Patient Quality Outreach      Summary:    Patient has the following on his problem list/HM:     Depression / Dysthymia review    6 Month Remission: 4-8 month window range: yes  12 Month Remission: 10-14 month window range: no       PHQ-9 SCORE 11/6/2019 12/9/2020 1/13/2021   PHQ-9 Total Score - - -   PHQ-9 Total Score MyChart - 16 (Moderately severe depression) -   PHQ-9 Total Score 10 16 4       If PHQ-9 recheck is 5 or more, route to provider for next steps.    Patient is due/failing the following:   PHQ-9 Needed    Type of outreach:    Sent [employer] message.    Questions for provider review:    None                                                                                                                                     Shun Martinez [facility]           Chart routed to none.

## 2021-06-29 ENCOUNTER — HOSPITAL ENCOUNTER (EMERGENCY)
Facility: CLINIC | Age: 31
End: 2021-06-29
Payer: COMMERCIAL

## 2021-06-29 ENCOUNTER — HOSPITAL ENCOUNTER (EMERGENCY)
Facility: CLINIC | Age: 31
Discharge: HOME | End: 2021-06-29
Attending: PHYSICIAN ASSISTANT | Admitting: PHYSICIAN ASSISTANT
Payer: COMMERCIAL

## 2021-06-29 VITALS
DIASTOLIC BLOOD PRESSURE: 90 MMHG | TEMPERATURE: 98 F | WEIGHT: 267.86 LBS | OXYGEN SATURATION: 97 % | BODY MASS INDEX: 40.14 KG/M2 | RESPIRATION RATE: 16 BRPM | HEART RATE: 87 BPM | SYSTOLIC BLOOD PRESSURE: 149 MMHG

## 2021-06-29 DIAGNOSIS — T18.108A FOREIGN BODY IN ESOPHAGUS, INITIAL ENCOUNTER: Primary | ICD-10-CM

## 2021-06-29 DIAGNOSIS — T18.108A ESOPHAGEAL FOREIGN BODY, INITIAL ENCOUNTER: ICD-10-CM

## 2021-06-29 PROCEDURE — 96374 THER/PROPH/DIAG INJ IV PUSH: CPT

## 2021-06-29 PROCEDURE — 96375 TX/PRO/DX INJ NEW DRUG ADDON: CPT

## 2021-06-29 PROCEDURE — 99285 EMERGENCY DEPT VISIT HI MDM: CPT | Mod: 25

## 2021-06-29 PROCEDURE — 255N000001 HC RX 255: Performed by: EMERGENCY MEDICINE

## 2021-06-29 PROCEDURE — 250N000011 HC RX IP 250 OP 636: Performed by: PHYSICIAN ASSISTANT

## 2021-06-29 RX ORDER — NALOXONE HYDROCHLORIDE 0.4 MG/ML
0.2 INJECTION, SOLUTION INTRAMUSCULAR; INTRAVENOUS; SUBCUTANEOUS
Status: CANCELLED | OUTPATIENT
Start: 2021-06-29

## 2021-06-29 RX ORDER — FLUMAZENIL 0.1 MG/ML
0.2 INJECTION, SOLUTION INTRAVENOUS
Status: CANCELLED | OUTPATIENT
Start: 2021-06-29 | End: 2021-06-30

## 2021-06-29 RX ORDER — LORAZEPAM 2 MG/ML
1 INJECTION INTRAMUSCULAR ONCE
Status: COMPLETED | OUTPATIENT
Start: 2021-06-29 | End: 2021-06-29

## 2021-06-29 RX ORDER — ONDANSETRON 2 MG/ML
4 INJECTION INTRAMUSCULAR; INTRAVENOUS EVERY 6 HOURS PRN
Status: CANCELLED | OUTPATIENT
Start: 2021-06-29

## 2021-06-29 RX ORDER — NALOXONE HYDROCHLORIDE 0.4 MG/ML
0.4 INJECTION, SOLUTION INTRAMUSCULAR; INTRAVENOUS; SUBCUTANEOUS
Status: CANCELLED | OUTPATIENT
Start: 2021-06-29

## 2021-06-29 RX ORDER — PROCHLORPERAZINE MALEATE 10 MG
10 TABLET ORAL EVERY 6 HOURS PRN
Status: CANCELLED | OUTPATIENT
Start: 2021-06-29

## 2021-06-29 RX ORDER — LIDOCAINE 40 MG/G
CREAM TOPICAL
Status: CANCELLED | OUTPATIENT
Start: 2021-06-29

## 2021-06-29 RX ORDER — ONDANSETRON 2 MG/ML
4 INJECTION INTRAMUSCULAR; INTRAVENOUS
Status: CANCELLED | OUTPATIENT
Start: 2021-06-29

## 2021-06-29 RX ORDER — ONDANSETRON 4 MG/1
4 TABLET, ORALLY DISINTEGRATING ORAL EVERY 6 HOURS PRN
Status: CANCELLED | OUTPATIENT
Start: 2021-06-29

## 2021-06-29 RX ADMIN — GLUCAGON HYDROCHLORIDE 1 MG: KIT at 21:11

## 2021-06-29 RX ADMIN — ANTACID/ANTIFLATULENT 4 G: 380; 550; 10; 10 GRANULE, EFFERVESCENT ORAL at 20:17

## 2021-06-29 RX ADMIN — LORAZEPAM 1 MG: 2 INJECTION INTRAMUSCULAR; INTRAVENOUS at 21:09

## 2021-06-29 ASSESSMENT — ENCOUNTER SYMPTOMS
SHORTNESS OF BREATH: 0
NERVOUS/ANXIOUS: 0
TROUBLE SWALLOWING: 1

## 2021-06-30 NOTE — ED NOTES
"Pt. Swallowed EZ-gas packet contents and lemon-line soda. Pt. Was able to successfully swollen with out vomiting or spitting up. Pt. Reported, \"It feels better.\"  "

## 2021-06-30 NOTE — ED PROVIDER NOTES
History   Chief Complaint:  Esophageal Foreign Body     HPI   Chao Pruett is a 30 year old male with a history of hypertension and anxiety who presents for evaluation of esophageal foreign body. The patient reports getting a piece of chicken stuck in his esophagus around 7 PM tonight. He is unable to manage his secretions or swallow water. He also reports that his gag reflex is continually being triggered. EZ gas was tried in triage without relief. The patient notes that he is anxious. He denies difficulty breathing or chest pain.     Review of Systems   HENT: Positive for trouble swallowing.         Esophageal foreign body   Respiratory: Negative for shortness of breath.    Cardiovascular: Negative for chest pain.   Psychiatric/Behavioral: The patient is not nervous/anxious.    All other systems reviewed and are negative.      Allergies:  The patient has no known allergies.     Medications:  Celexa   Atarax  Atrovent    Past Medical History:    Hypertension  Anxiety   Depression    Past Surgical History:    Appendectomy   Open tibial shaft fracture   Wrist fracture      Family History:    Hypertension - mother, father    Hyperlipidemia - mother, father   Anxiety - mother   Depression - mother, father    Social History:  Presents to the ED: with his wife and 11 month old child.   PCP: Frandy Sanchez    Physical Exam     Patient Vitals for the past 24 hrs:   BP Temp Pulse Resp SpO2 Weight   06/29/21 2212 -- -- -- 16 -- --   06/29/21 2145 -- -- -- -- 97 % --   06/29/21 2142 -- -- -- -- 98 % --   06/29/21 2141 -- -- -- -- 96 % --   06/29/21 2140 -- -- -- -- 95 % --   06/29/21 2135 (!) 149/90 -- -- -- 95 % --   06/29/21 2130 (!) 149/90 -- 87 -- 95 % --   06/29/21 2115 133/88 -- 77 -- 98 % --   06/29/21 2012 (!) 159/97 98  F (36.7  C) 88 18 99 % 121.5 kg (267 lb 13.7 oz)       Physical Exam  General:          Alert, interactive.  Spitting secretions into garbage.  Walking around room, appears uncomfortable.  Head:               Scalp is atraumatic.  Eyes:               No scleral icterus or conjunctivitis.   ENT:                                      Ears:                The external ears are normal.   Nose:               The external nose is normal.  Throat:             Mucus membranes are moist.   No oropharyngeal swelling.  No obvious foreign body.                Neck:              Normal range of motion.   CV:                  Regular rate as noted above  Resp:               Non-labored, no retractions or accessory muscle use.  MS:                  Normal range of motion.   Skin:               Warm and dry.   Neuro:   Strength and sensation grossly intact.   Psych:             Awake. Alert.  Appropriate interactions.        Emergency Department Course     Emergency Department Course:    Reviewed:  I reviewed the patient's nursing notes, vitals and past medical history.     Assessments:  2050 I performed an exam of the patient in room ED36 as documented above.  2146 Patient successfully swallowed and passed foreign body into stomach without vomiting or spitting up after EZ packet contents and lemon-lime soda.    Consults:    2114 I consulted with Dr. Quentin Morrison from [facility].     Interventions:  2017 EZ Gas, 4 g, Oral   2109 Ativan, 1 mg, IV  2111 Glucagon, 1 mg, IV    Disposition:  The patient was discharged to home.     Impression & Plan     CMS Diagnoses: None    Medical Decision Making:  Chao Pruett is a 30 year old male who presents to the emergency department today for evaluation of esophageal foreign body.  Patient reports history of having difficulty passing food and followed up with his primary care at one point and they discussed endoscopy.  This evening, he reports piece of chicken stuck in the throat.  Initially, triage attempted EZ gas.  On arrival here, patient was very anxious and Ativan and glucagon given.  Nurse we attempted EZ gas with success of food passing.  Patient felt improved and was able to drink  without difficulty.  Recommended close follow-up with GI for endoscopy and trial of omeprazole prescribed.  Recommended soft diet.  Patient feels comfortable with this plan all questions and concerns addressed prior to discharge home.    Diagnosis:    ICD-10-CM    1. Foreign body in esophagus, initial encounter  T18.108A    2. Esophageal foreign body, initial encounter  T18.108A        Discharge Medications:   Discharge Medication List as of 6/29/2021 10:02 PM      START taking these medications    Details   omeprazole (PRILOSEC) 20 MG DR capsule Take 2 capsules (40 mg) by mouth daily for 14 days, Disp-28 capsule, R-0, Local Print             Scribe Disclosure:  I, Fadumo Gupta, am serving as a scribe at 8:52 PM on 6/29/2021 to document services personally performed by Michelle Castano PA-C based on my observations and the provider's statements to me.    This note was completed in part using Dragon voice recognition software. Although reviewed after completion, some word and grammatical errors may occur.      Michelle Castano PA-C  06/29/21 [id number]

## 2021-06-30 NOTE — PROGRESS NOTES
GI    Called by ED PA regarding pt presenting with esophageal FB, inability to tolerate secretions, emesis after attempt at EZ gas.     OR not available for at least 2 hrs for emergent surgical case.  Updated ED.  Planning for EGD when OR available, assuming EGD is unremarkable, will then discharge back to the ED, and home.       Quentin Morrison DO   [facility]  Cell [phone]

## 2021-06-30 NOTE — ED TRIAGE NOTES
Pt was eating chicken at diner, now feels like a piece is stuck in his esophagus.  Unable to tolerate oral secretions.

## 2021-07-07 DIAGNOSIS — Z11.59 ENCOUNTER FOR SCREENING FOR OTHER VIRAL DISEASES: ICD-10-CM

## 2021-07-19 ENCOUNTER — LAB (OUTPATIENT)
Dept: LAB | Facility: CLINIC | Age: 31
End: 2021-07-19
Attending: INTERNAL MEDICINE
Payer: COMMERCIAL

## 2021-07-19 DIAGNOSIS — Z11.59 ENCOUNTER FOR SCREENING FOR OTHER VIRAL DISEASES: ICD-10-CM

## 2021-07-19 PROCEDURE — U0003 INFECTIOUS AGENT DETECTION BY NUCLEIC ACID (DNA OR RNA); SEVERE ACUTE RESPIRATORY SYNDROME CORONAVIRUS 2 (SARS-COV-2) (CORONAVIRUS DISEASE [COVID-19]), AMPLIFIED PROBE TECHNIQUE, MAKING USE OF HIGH THROUGHPUT TECHNOLOGIES AS DESCRIBED BY CMS-2020-01-R: HCPCS

## 2021-07-19 PROCEDURE — U0005 INFEC AGEN DETEC AMPLI PROBE: HCPCS

## 2021-07-20 LAB — SARS-COV-2 RNA RESP QL NAA+PROBE: NEGATIVE

## 2021-07-23 ENCOUNTER — HOSPITAL ENCOUNTER (OUTPATIENT)
Facility: CLINIC | Age: 31
Discharge: HOME | End: 2021-07-23
Attending: INTERNAL MEDICINE | Admitting: INTERNAL MEDICINE
Payer: COMMERCIAL

## 2021-07-23 VITALS
DIASTOLIC BLOOD PRESSURE: 62 MMHG | SYSTOLIC BLOOD PRESSURE: 118 MMHG | HEART RATE: 59 BPM | OXYGEN SATURATION: 96 % | TEMPERATURE: 97.6 F | RESPIRATION RATE: 16 BRPM

## 2021-07-23 LAB — UPPER GI ENDOSCOPY: NORMAL

## 2021-07-23 PROCEDURE — 250N000011 HC RX IP 250 OP 636: Performed by: INTERNAL MEDICINE

## 2021-07-23 PROCEDURE — 999N000099 HC STATISTIC MODERATE SEDATION < 10 MIN: Performed by: INTERNAL MEDICINE

## 2021-07-23 PROCEDURE — 88305 TISSUE EXAM BY PATHOLOGIST: CPT | Mod: TC | Performed by: INTERNAL MEDICINE

## 2021-07-23 PROCEDURE — 43239 EGD BIOPSY SINGLE/MULTIPLE: CPT | Performed by: INTERNAL MEDICINE

## 2021-07-23 PROCEDURE — 88305 TISSUE EXAM BY PATHOLOGIST: CPT | Mod: 26 | Performed by: PATHOLOGY

## 2021-07-23 PROCEDURE — 250N000009 HC RX 250: Performed by: INTERNAL MEDICINE

## 2021-07-23 RX ORDER — LIDOCAINE 40 MG/G
CREAM TOPICAL
Status: DISCONTINUED | OUTPATIENT
Start: 2021-07-23 | End: 2021-07-23 | Stop reason: HOSPADM

## 2021-07-23 RX ORDER — PROCHLORPERAZINE MALEATE 10 MG
10 TABLET ORAL EVERY 6 HOURS PRN
Status: DISCONTINUED | OUTPATIENT
Start: 2021-07-23 | End: 2021-07-23 | Stop reason: HOSPADM

## 2021-07-23 RX ORDER — SIMETHICONE 40MG/0.6ML
133 SUSPENSION, DROPS(FINAL DOSAGE FORM)(ML) ORAL
Status: DISCONTINUED | OUTPATIENT
Start: 2021-07-23 | End: 2021-07-23 | Stop reason: HOSPADM

## 2021-07-23 RX ORDER — NALOXONE HYDROCHLORIDE 0.4 MG/ML
0.4 INJECTION, SOLUTION INTRAMUSCULAR; INTRAVENOUS; SUBCUTANEOUS
Status: DISCONTINUED | OUTPATIENT
Start: 2021-07-23 | End: 2021-07-23 | Stop reason: HOSPADM

## 2021-07-23 RX ORDER — FLUMAZENIL 0.1 MG/ML
0.2 INJECTION, SOLUTION INTRAVENOUS
Status: DISCONTINUED | OUTPATIENT
Start: 2021-07-23 | End: 2021-07-23 | Stop reason: HOSPADM

## 2021-07-23 RX ORDER — ATROPINE SULFATE 0.4 MG/ML
0.4 AMPUL (ML) INJECTION
Status: DISCONTINUED | OUTPATIENT
Start: 2021-07-23 | End: 2021-07-23 | Stop reason: HOSPADM

## 2021-07-23 RX ORDER — ONDANSETRON 2 MG/ML
4 INJECTION INTRAMUSCULAR; INTRAVENOUS EVERY 6 HOURS PRN
Status: DISCONTINUED | OUTPATIENT
Start: 2021-07-23 | End: 2021-07-23 | Stop reason: HOSPADM

## 2021-07-23 RX ORDER — NALOXONE HYDROCHLORIDE 0.4 MG/ML
0.2 INJECTION, SOLUTION INTRAMUSCULAR; INTRAVENOUS; SUBCUTANEOUS
Status: DISCONTINUED | OUTPATIENT
Start: 2021-07-23 | End: 2021-07-23 | Stop reason: HOSPADM

## 2021-07-23 RX ORDER — ONDANSETRON 2 MG/ML
4 INJECTION INTRAMUSCULAR; INTRAVENOUS
Status: DISCONTINUED | OUTPATIENT
Start: 2021-07-23 | End: 2021-07-23 | Stop reason: HOSPADM

## 2021-07-23 RX ORDER — FENTANYL CITRATE 50 UG/ML
25-50 INJECTION, SOLUTION INTRAMUSCULAR; INTRAVENOUS
Status: DISCONTINUED | OUTPATIENT
Start: 2021-07-23 | End: 2021-07-23 | Stop reason: HOSPADM

## 2021-07-23 RX ORDER — EPINEPHRINE 1 MG/ML
0.1 INJECTION, SOLUTION, CONCENTRATE INTRAVENOUS
Status: DISCONTINUED | OUTPATIENT
Start: 2021-07-23 | End: 2021-07-23 | Stop reason: HOSPADM

## 2021-07-23 RX ORDER — FENTANYL CITRATE 50 UG/ML
50-100 INJECTION, SOLUTION INTRAMUSCULAR; INTRAVENOUS
Status: COMPLETED | OUTPATIENT
Start: 2021-07-23 | End: 2021-07-23

## 2021-07-23 RX ORDER — ONDANSETRON 4 MG/1
4 TABLET, ORALLY DISINTEGRATING ORAL EVERY 6 HOURS PRN
Status: DISCONTINUED | OUTPATIENT
Start: 2021-07-23 | End: 2021-07-23 | Stop reason: HOSPADM

## 2021-07-23 RX ADMIN — TOPICAL ANESTHETIC 1 SPRAY: 200 SPRAY DENTAL; PERIODONTAL at 11:08

## 2021-07-23 RX ADMIN — MIDAZOLAM 2 MG: 1 INJECTION INTRAMUSCULAR; INTRAVENOUS at 11:08

## 2021-07-23 RX ADMIN — FENTANYL CITRATE 100 MCG: 50 INJECTION, SOLUTION INTRAMUSCULAR; INTRAVENOUS at 11:08

## 2021-07-23 NOTE — LETTER
July 8, 2021      Chao Pruett  [address] [place] [address]        Dear Chao,     Thank you for choosing [facility]. You are scheduled for the following service(s).   Please be aware that coverage of these services is subject to the terms and limitations of your health insurance plan.  Call member services at your health plan with any benefit or coverage questions.    Date:   7/23/21      Procedure: UPPER ENDOSCOPY-EGD  Doctor: Dr. Albright           Arrival Time:  10:30am   *Enter and check in at the Main Hospital Entrance  Procedure Time: 11:00am       Location:   [facility]        Endoscopy Department, First Floor *         [address] [place], [place] [address] [phone] or [phone] ([facility]) to reschedule            PRE-PROCEDURE CHECKLIST    If you have diabetes, ask your regular doctor for diet and medication restrictions.  If you take any antiplatelet or anticoagulant medications (such as Coumadin, Lovenox, Plavix, etc.) and have not already discussed this, please call your primary physician for advice on holding this medication.  If you take Aspirin, you may continue to do so.  If you are or may be pregnant, please discuss the risks and benefits of this procedure with your doctor.  You must arrange for a ride for the day of your exam. If you fail to arrange transportation with a responsible adult, your procedure will need to be cancelled and rescheduled. Taxi, bus and medical transport are not acceptable unless you have a responsible adult that you know & trust with you. Please arrange for this  to be able to pick you up in our department, approximately one hour after your scheduled procedure, if they are not able to stay with you.      Canceling or rescheduling   If you must cancel or reschedule your appointment, please call [phone] as soon as possible.      Upper Endoscopy or Esophagogastroduodenoscopy (EGD)  is a test performed to evaluate symptoms of persistent abdominal pain, nausea, vomiting and difficulty swallowing. It may also be used to treat various conditions of the upper gastrointestinal tract, such as bleeding, narrowing or abnormal growths.     What happens during an upper endoscopy?  On the day of your procedure, plan to spend up to one and a half hour after your arrival at the endoscopy center. The exam itself takes about 5 to 10 minutes.    Before the exam:  - You will change into a gown.   - Your medical history and medication list will be reviewed with you, unless it has already been done over the phone.   - A nurse will insert an intravenous (IV) line into your hand or arm.  - The doctor will talk to you and give you a consent form to sign.    During the exam:  - Medicine will be given through the IV line to help you relax and feel comfortable.   - Your heart rate and oxygen levels will be monitored. If your blood pressure is low, you may be given fluids through the IV line.   - The doctor will insert a flexible, hollow tube, called an endoscope, into your mouth and will advance it slowly through the esophagus, stomach and duodenum (the first part of your small intestine).   - You may have a feeling of pressure or fullness.   - If you have difficulty swallowing, and the doctor finds a narrowing in your esophagus, it may be possible for the area to be expanded-dilated during the exam.   - If abnormal tissue is found, the doctor may remove it through the endoscope (biopsy it) for closer examination. The tissue removal is painless.    After the exam:  - Any tissue samples removed during the exam will be sent to a lab for evaluation. It may take 5 to 7 working days for you to be notified of the results  - The doctor will prepare a full report for the physician who referred you for the upper endoscopy.   - The doctor will talk with you about the initial results of your exam.   - You may feel bloated after  the procedure. That is normal and should not last long.   - Your throat may feel sore for a short time.   - Following the exam, you may resume your normal diet. Avoid alcohol until the next day.   - You may resume your regular activities the day after the procedure.   - Medication given during the exam will prohibit you from driving for the rest of the day.  - A nurse will provide you with complete discharge instructions before you leave the endoscopy center. Be sure to ask the nurse for specific instructions if you take blood thinners such as Aspirin , Coumadin , Lovenox , Plavix , etc.       PREPARATION    To ensure a successful exam, please follow all instructions carefully.      The night before your exam:    STOP eating solid foods at 11:45 pm.     Clear liquids are okay to drink (examples: Gatorade , apple juice, clear broth,coffee or tea without milk or cream, etc.).     DO NOT drink red liquids or alcoholic beverages.    The day of your exam:    STOP drinking clear liquids 4 hours before your exam.     You may take your usual medications with 4 oz. of water, but it needs to be at least 4 hours prior to your procedure.    When you leave for the procedure:    Bring a list of all of your current medications, including any allergy or over-the-counter medications, unless you have already reviewed that with an Endoscopy RN over the phone.     Bring a photo ID as well as up-to-date insurance information, such as your insurance card and any referral forms that might be required by your payer.               DIRECTIONS TO THE ENDOSCOPY DEPARTMENT    From the north ([facility])  Take [address], exit on [address] . Get into the left hand taran, turn left (east), go one-half mile to [address] and turn left. Go north to the second stoplight, take a right on [address] and follow it to the Main Hospital entrance.  From the south ([facility])  Take 35N to the 35E split  and exit on [address] . On [address] , turn left (west) to [address]. Turn right (north) on [address]. Go north to the second stoplight, take a right on [address] and follow it to the Main Hospital entrance.  From the east via 35E ([facility])  Take [address]E south to [address]  exit. Turn right on [address] . Go west to [address]. Turn right (north) on [address]. Go to the second stoplight, take a right on [address] to the Main Hospital entrance.  From the east via [address] ([facility])  Take [address] to [address]. Turn left (south) on [address] to [address], turn left (east) on [address] and follow it to the Main Hospital entrance.    From the west via [address] ([place], [place])  Take [address] east to [address]. Turn right (south) on [address] to [address], turn left (east) on [address] and follow it to the Main Hospital entrance.

## 2021-07-23 NOTE — H&P
Pre-Endoscopy History and Physical     Chao Pruett MRN# [id number]   YOB: 1990 Age: 30 year old     Date of Procedure: 7/23/2021  Primary care provider: Frandy Sanchez  Type of Endoscopy: Gastroscopy with possible biopsy, possible dilation  Reason for Procedure: dysphagia  Type of Anesthesia Anticipated: Conscious Sedation    HPI:    Chao is a 30 year old male who will be undergoing the above procedure.      A history and physical has been performed. The patient's medications and allergies have been reviewed. The risks and benefits of the procedure and the sedation options and risks were discussed with the patient.  All questions were answered and informed consent was obtained.      He denies a personal or family history of anesthesia complications or bleeding disorders.     Patient Active Problem List   Diagnosis     Anxiety     Elevated blood pressure reading without diagnosis of hypertension     BMI 40.0-44.9, adult (H)        Past Medical History:   Diagnosis Date     History of hypertension         Past Surgical History:   Procedure Laterality Date     APPENDECTOMY       FRACTURE TX, WRIST RT/LT      LT - hardware removed     HC OPEN TX TIBIAL SHAFT FX W PLATE/SCREWS W/WO CERCLAGE  2008       Social History     Tobacco Use     Smoking status: Current Every Day Smoker     Types: Dip, chew, snus or snuff     Smokeless tobacco: Current User     Types: Chew     Tobacco comment: 3-4x/day   Substance Use Topics     Alcohol use: Yes     Comment: occasionally       Family History   Problem Relation Age of Onset     Hypertension Mother      Lipids Mother      Anxiety Disorder Mother      Depression Mother      Hypertension Father      Lipids Father      Cancer Maternal Grandmother         ovarian     Diabetes Maternal Grandfather      Lipids Paternal Grandfather      No Known Problems Sister        Prior to Admission medications    Medication Sig Start Date End Date Taking? Authorizing Provider  "  citalopram (CELEXA) 20 MG tablet Take 1 tablet (20 mg) by mouth daily 6/8/21  Yes Frandy Sanchez MD   hydrOXYzine (ATARAX) 25 MG tablet Take 1 tablet (25 mg) by mouth 3 times daily as needed for anxiety 1/13/21  Yes Frandy Sanchez MD   ipratropium (ATROVENT) 0.06 % nasal spray Spray 2 sprays in nostril 4 times daily 4/12/21   Lyn Lao PA-C       Allergies   Allergen Reactions     No Known Drug Allergy         REVIEW OF SYSTEMS:   5 point ROS negative except as noted above in HPI, including Gen., Resp., CV, GI &  system review.    PHYSICAL EXAM:   There were no vitals taken for this visit. Estimated body mass index is 40.14 kg/m  as calculated from the following:    Height as of 1/13/21: 1.74 m (5' 8.5\").    Weight as of 6/29/21: 121.5 kg (267 lb 13.7 oz).   GENERAL APPEARANCE: alert, and oriented  MENTAL STATUS: alert  AIRWAY EXAM: Mallampatti Class I (visualization of the soft palate, fauces, uvula, anterior and posterior pillars)  RESP: lungs clear to auscultation - no rales, rhonchi or wheezes  CV: regular rates and rhythm  DIAGNOSTICS:    Not indicated    IMPRESSION   ASA Class 1 - Healthy patient, no medical problems    PLAN:   Plan for Gastroscopy with possible biopsy, possible dilation. We discussed the risks, benefits and alternatives and the patient wished to proceed.    The above has been forwarded to the consulting provider.      Signed Electronically by: Sharan Albright MD  July 23, 2021          "

## 2021-07-26 LAB
PATH REPORT.COMMENTS IMP SPEC: NORMAL
PATH REPORT.COMMENTS IMP SPEC: NORMAL
PATH REPORT.FINAL DX SPEC: NORMAL
PATH REPORT.GROSS SPEC: NORMAL
PATH REPORT.MICROSCOPIC SPEC OTHER STN: NORMAL
PATH REPORT.RELEVANT HX SPEC: NORMAL
PHOTO IMAGE: NORMAL

## 2021-10-24 ENCOUNTER — HEALTH MAINTENANCE LETTER (OUTPATIENT)
Age: 31
End: 2021-10-24

## 2022-01-06 ENCOUNTER — OFFICE VISIT (OUTPATIENT)
Dept: FAMILY MEDICINE | Facility: CLINIC | Age: 32
End: 2022-01-06
Payer: COMMERCIAL

## 2022-01-06 VITALS
OXYGEN SATURATION: 95 % | SYSTOLIC BLOOD PRESSURE: 130 MMHG | BODY MASS INDEX: 43.27 KG/M2 | WEIGHT: 288.8 LBS | TEMPERATURE: 98.8 F | DIASTOLIC BLOOD PRESSURE: 80 MMHG | HEART RATE: 100 BPM

## 2022-01-06 DIAGNOSIS — H66.003 ACUTE SUPPURATIVE OTITIS MEDIA OF BOTH EARS WITHOUT SPONTANEOUS RUPTURE OF TYMPANIC MEMBRANES, RECURRENCE NOT SPECIFIED: Primary | ICD-10-CM

## 2022-01-06 PROCEDURE — 99213 OFFICE O/P EST LOW 20 MIN: CPT | Performed by: PHYSICIAN ASSISTANT

## 2022-01-06 RX ORDER — AMOXICILLIN 500 MG/1
500 CAPSULE ORAL 2 TIMES DAILY
Qty: 14 CAPSULE | Refills: 0 | Status: SHIPPED | OUTPATIENT
Start: 2022-01-06 | End: 2022-01-13

## 2022-01-06 ASSESSMENT — PATIENT HEALTH QUESTIONNAIRE - PHQ9: SUM OF ALL RESPONSES TO PHQ QUESTIONS 1-9: 0

## 2022-01-06 NOTE — PROGRESS NOTES
Assessment & Plan     Acute suppurative otitis media of both ears without spontaneous rupture of tympanic membranes, recurrence not specified    Treat with amoxicillin.    - amoxicillin (AMOXIL) 500 MG capsule; Take 1 capsule (500 mg) by mouth 2 times daily for 7 days                   No follow-ups on file.    BRENNAN Hewitt [facility] JAMES Magallanes is a 31 year old who presents for the following health issues     HPI     Acute Illness  Acute illness concerns: ear infection  Onset/Duration: 1 day  Symptoms:  Sinus Pressure: YES  Ear Pain: YES: bilateral  Denies cough and sore throat. Denies fever and chills.   Therapies tried and outcome: ibuprofen helps a little      Review of Systems   Constitutional, HEENT, cardiovascular, pulmonary, gi and gu systems are negative, except as otherwise noted.        Objective    /80 (BP Location: Right arm, Patient Position: Sitting, Cuff Size: Adult Regular)   Pulse 100   Temp 98.8  F (37.1  C) (Oral)   Wt 131 kg (288 lb 12.8 oz)   SpO2 95%   BMI 43.27 kg/m    Body mass index is 43.27 kg/m .       Physical Exam   GENERAL: healthy, alert and no distress  EYES: Eyes grossly normal to inspection, PERRL and conjunctivae and sclerae normal  HENT: normal cephalic/atraumatic, both ears: erythematous and bulging membrane, nose and mouth without ulcers or lesions, oropharynx clear and oral mucous membranes moist  RESP: lungs clear to auscultation - no rales, rhonchi or wheezes  CV: regular rate and rhythm, normal S1 S2, no S3 or S4, no murmur, click or rub, no peripheral edema and peripheral pulses strong  MS: no gross musculoskeletal defects noted, no edema  SKIN: no suspicious lesions or rashes  NEURO: Normal strength and tone, mentation intact and speech normal  PSYCH: mentation appears normal, affect normal/bright  LYMPH: no cervical, supraclavicular, axillary, or inguinal adenopathy

## 2022-02-13 ENCOUNTER — HEALTH MAINTENANCE LETTER (OUTPATIENT)
Age: 32
End: 2022-02-13

## 2022-02-14 DIAGNOSIS — F32.A ANXIETY AND DEPRESSION: ICD-10-CM

## 2022-02-14 DIAGNOSIS — F41.9 ANXIETY AND DEPRESSION: ICD-10-CM

## 2022-02-15 RX ORDER — HYDROXYZINE HYDROCHLORIDE 25 MG/1
25 TABLET, FILM COATED ORAL 3 TIMES DAILY PRN
Qty: 90 TABLET | Refills: 3 | Status: SHIPPED | OUTPATIENT
Start: 2022-02-15 | End: 2023-12-05

## 2022-02-15 NOTE — TELEPHONE ENCOUNTER
Prescription approved per [facility] Refill Protocol.    Josefa Boudreaux RN on 2/15/2022 at 12:34 PM

## 2022-03-30 NOTE — PROGRESS NOTES
Pre-Visit Planning     Appointment Notes for this encounter:   Reviewed - zg. Annual check up    Questionnaires Reviewed/Assigned  No additional questionnaires are needed    Patient preferred phone number: [phone]    Unable to reach. Left voicemail. Advised patient to call clinic back at [phone].

## 2022-03-31 ENCOUNTER — OFFICE VISIT (OUTPATIENT)
Dept: FAMILY MEDICINE | Facility: CLINIC | Age: 32
End: 2022-03-31
Payer: COMMERCIAL

## 2022-03-31 VITALS
TEMPERATURE: 98 F | HEIGHT: 69 IN | DIASTOLIC BLOOD PRESSURE: 78 MMHG | BODY MASS INDEX: 43.4 KG/M2 | HEART RATE: 100 BPM | RESPIRATION RATE: 16 BRPM | WEIGHT: 293 LBS | SYSTOLIC BLOOD PRESSURE: 138 MMHG | OXYGEN SATURATION: 99 %

## 2022-03-31 DIAGNOSIS — Z00.00 ROUTINE GENERAL MEDICAL EXAMINATION AT A HEALTH CARE FACILITY: Primary | ICD-10-CM

## 2022-03-31 DIAGNOSIS — E66.01 MORBID OBESITY (H): ICD-10-CM

## 2022-03-31 DIAGNOSIS — F41.9 ANXIETY AND DEPRESSION: ICD-10-CM

## 2022-03-31 DIAGNOSIS — N18.2 CKD (CHRONIC KIDNEY DISEASE) STAGE 2, GFR 60-89 ML/MIN: ICD-10-CM

## 2022-03-31 DIAGNOSIS — L73.9 FOLLICULITIS: ICD-10-CM

## 2022-03-31 DIAGNOSIS — F32.A ANXIETY AND DEPRESSION: ICD-10-CM

## 2022-03-31 DIAGNOSIS — R06.83 SNORING: ICD-10-CM

## 2022-03-31 PROCEDURE — 99214 OFFICE O/P EST MOD 30 MIN: CPT | Mod: 25 | Performed by: FAMILY MEDICINE

## 2022-03-31 PROCEDURE — 99395 PREV VISIT EST AGE 18-39: CPT | Performed by: FAMILY MEDICINE

## 2022-03-31 RX ORDER — DOXYCYCLINE 100 MG/1
100 CAPSULE ORAL 2 TIMES DAILY
Qty: 56 CAPSULE | Refills: 0 | Status: SHIPPED | OUTPATIENT
Start: 2022-03-31 | End: 2022-04-28

## 2022-03-31 RX ORDER — CITALOPRAM HYDROBROMIDE 20 MG/1
20 TABLET ORAL DAILY
Qty: 60 TABLET | Refills: 4 | Status: SHIPPED | OUTPATIENT
Start: 2022-03-31 | End: 2022-06-29

## 2022-03-31 SDOH — HEALTH STABILITY: PHYSICAL HEALTH: ON AVERAGE, HOW MANY DAYS PER WEEK DO YOU ENGAGE IN MODERATE TO STRENUOUS EXERCISE (LIKE A BRISK WALK)?: 3 DAYS

## 2022-03-31 SDOH — ECONOMIC STABILITY: INCOME INSECURITY: IN THE LAST 12 MONTHS, WAS THERE A TIME WHEN YOU WERE NOT ABLE TO PAY THE MORTGAGE OR RENT ON TIME?: NO

## 2022-03-31 SDOH — HEALTH STABILITY: PHYSICAL HEALTH: ON AVERAGE, HOW MANY MINUTES DO YOU ENGAGE IN EXERCISE AT THIS LEVEL?: 30 MIN

## 2022-03-31 SDOH — ECONOMIC STABILITY: TRANSPORTATION INSECURITY
IN THE PAST 12 MONTHS, HAS LACK OF TRANSPORTATION KEPT YOU FROM MEETINGS, WORK, OR FROM GETTING THINGS NEEDED FOR DAILY LIVING?: NO

## 2022-03-31 SDOH — ECONOMIC STABILITY: INCOME INSECURITY: HOW HARD IS IT FOR YOU TO PAY FOR THE VERY BASICS LIKE FOOD, HOUSING, MEDICAL CARE, AND HEATING?: NOT VERY HARD

## 2022-03-31 SDOH — ECONOMIC STABILITY: TRANSPORTATION INSECURITY
IN THE PAST 12 MONTHS, HAS THE LACK OF TRANSPORTATION KEPT YOU FROM MEDICAL APPOINTMENTS OR FROM GETTING MEDICATIONS?: NO

## 2022-03-31 SDOH — ECONOMIC STABILITY: FOOD INSECURITY: WITHIN THE PAST 12 MONTHS, THE FOOD YOU BOUGHT JUST DIDN'T LAST AND YOU DIDN'T HAVE MONEY TO GET MORE.: NEVER TRUE

## 2022-03-31 SDOH — ECONOMIC STABILITY: FOOD INSECURITY: WITHIN THE PAST 12 MONTHS, YOU WORRIED THAT YOUR FOOD WOULD RUN OUT BEFORE YOU GOT MONEY TO BUY MORE.: NEVER TRUE

## 2022-03-31 ASSESSMENT — ENCOUNTER SYMPTOMS
PALPITATIONS: 0
HEADACHES: 0
NERVOUS/ANXIOUS: 0
MYALGIAS: 0
DYSURIA: 0
HEMATURIA: 0
NAUSEA: 0
CONSTIPATION: 0
JOINT SWELLING: 0
SHORTNESS OF BREATH: 0
DIARRHEA: 0
ARTHRALGIAS: 0
HEARTBURN: 0
PARESTHESIAS: 0
FEVER: 0
FREQUENCY: 0
CHILLS: 0
COUGH: 0
DIZZINESS: 0
EYE PAIN: 0
HEMATOCHEZIA: 0
SORE THROAT: 0
ABDOMINAL PAIN: 0
WEAKNESS: 0

## 2022-03-31 ASSESSMENT — LIFESTYLE VARIABLES
HOW OFTEN DO YOU HAVE A DRINK CONTAINING ALCOHOL: MONTHLY OR LESS
HOW OFTEN DO YOU HAVE SIX OR MORE DRINKS ON ONE OCCASION: NEVER
HOW MANY STANDARD DRINKS CONTAINING ALCOHOL DO YOU HAVE ON A TYPICAL DAY: 1 OR 2

## 2022-03-31 ASSESSMENT — SOCIAL DETERMINANTS OF HEALTH (SDOH)
HOW OFTEN DO YOU ATTEND CHURCH OR RELIGIOUS SERVICES?: MORE THAN 4 TIMES PER YEAR
HOW OFTEN DO YOU GET TOGETHER WITH FRIENDS OR RELATIVES?: ONCE A WEEK
DO YOU BELONG TO ANY CLUBS OR ORGANIZATIONS SUCH AS CHURCH GROUPS UNIONS, FRATERNAL OR ATHLETIC GROUPS, OR SCHOOL GROUPS?: NO
IN A TYPICAL WEEK, HOW MANY TIMES DO YOU TALK ON THE PHONE WITH FAMILY, FRIENDS, OR NEIGHBORS?: MORE THAN THREE TIMES A WEEK

## 2022-03-31 NOTE — PROGRESS NOTES
SUBJECTIVE:   CC: Chao Pruett is an 31 year old male who presents for preventative health visit.       Patient has been advised of split billing requirements and indicates understanding: Yes  Healthy Habits:     Getting at least 3 servings of Calcium per day:  NO    Bi-annual eye exam:  NO    Dental care twice a year:  Yes    Sleep apnea or symptoms of sleep apnea:  Daytime drowsiness, Excessive snoring and Sleep apnea    Diet:  Regular (no restrictions)    Frequency of exercise:  1 day/week    Duration of exercise:  15-30 minutes    Taking medications regularly:  Yes    Medication side effects:  None    PHQ-2 Total Score: 0    Additional concerns today:  No      Patient has additional concerns today, is requesting a referral for his snoring.  He would like to have evaluation for sleep apnea, has loud snoring, no nighttime awakening.  Feels refreshed.  Under pressure from his wife, he did use a CPAP machine, not his prescription to help with the symptoms.    He would also like a refill on his mental health medications which she finds helpful.  Recently has increased stress as he has a second child on the way in the next 3 weeks, recently took a job promotion.  Elpidio has been treated by me for the last couple years for his anxiety.  Uses Atarax as needed for panic attacks, takes 2 to 3 tablets a week and finds it very helpful.       Today's PHQ-2 Score:   PHQ-2 ( 1999 Pfizer) 3/31/2022   Q1: Little interest or pleasure in doing things 0   Q2: Feeling down, depressed or hopeless 0   PHQ-2 Score 0   PHQ-2 Total Score (12-17 Years)- Positive if 3 or more points; Administer PHQ-A if positive -   Q1: Little interest or pleasure in doing things Not at all   Q2: Feeling down, depressed or hopeless Not at all   PHQ-2 Score 0       Abuse: Current or Past(Physical, Sexual or Emotional)- No  Do you feel safe in your environment? Yes    Have you ever done Advance Care Planning? (For example, a Health Directive, POLST, or a  discussion with a medical provider or your loved ones about your wishes): No, advance care planning information given to patient to review.  Patient declined advance care planning discussion at this time.    Social History     Tobacco Use     Smoking status: Never Smoker     Smokeless tobacco: Current User     Types: Chew     Tobacco comment: 3-4x/day   Substance Use Topics     Alcohol use: Yes     Comment: occasionally, 1 a month         Alcohol Use 3/31/2022   Prescreen: >3 drinks/day or >7 drinks/week? Not Applicable   Prescreen: >3 drinks/day or >7 drinks/week? -       Last PSA: No results found for: PSA    Reviewed orders with patient. Reviewed health maintenance and updated orders accordingly - Yes  Lab work is in process  Labs reviewed in EPIC    Reviewed and updated as needed this visit by clinical staff   Tobacco  Allergies  Meds   Med Hx  Surg Hx  Fam Hx  Soc Hx        Reviewed and updated as needed this visit by Provider     Meds             Past Medical History:   Diagnosis Date     History of hypertension         Review of Systems   Constitutional: Negative for chills and fever.   HENT: Negative for congestion, ear pain, hearing loss and sore throat.    Eyes: Negative for pain and visual disturbance.   Respiratory: Negative for cough and shortness of breath.    Cardiovascular: Negative for chest pain, palpitations and peripheral edema.   Gastrointestinal: Negative for abdominal pain, constipation, diarrhea, heartburn, hematochezia and nausea.   Genitourinary: Negative for dysuria, frequency, genital sores, hematuria, impotence, penile discharge and urgency.   Musculoskeletal: Negative for arthralgias, joint swelling and myalgias.   Skin: Negative for rash.   Neurological: Negative for dizziness, weakness, headaches and paresthesias.   Psychiatric/Behavioral: Negative for mood changes. The patient is not nervous/anxious.        OBJECTIVE:   /78   Pulse 100   Temp 98  F (36.7  C) (Oral)    "Resp 16   Ht 1.74 m (5' 8.5\")   Wt 132.9 kg (293 lb)   SpO2 99%   BMI 43.90 kg/m      Physical Exam  Vitals reviewed.   Constitutional:       General: He is not in acute distress.     Appearance: Normal appearance. He is not ill-appearing.   HENT:      Head: Normocephalic and atraumatic.      Right Ear: External ear normal.      Left Ear: External ear normal.      Nose: Nose normal.      Mouth/Throat:      Mouth: Mucous membranes are moist.      Pharynx: Oropharynx is clear.   Eyes:      General: No scleral icterus.        Right eye: No discharge.         Left eye: No discharge.      Extraocular Movements: Extraocular movements intact.      Pupils: Pupils are equal, round, and reactive to light.   Neck:      Vascular: No JVD.   Cardiovascular:      Rate and Rhythm: Normal rate and regular rhythm.   Pulmonary:      Effort: Pulmonary effort is normal. No respiratory distress.      Breath sounds: Normal breath sounds.   Abdominal:      General: Abdomen is flat. Bowel sounds are normal.      Palpations: Abdomen is soft.      Comments: Abdominal striae   Musculoskeletal:         General: No swelling.      Cervical back: Normal range of motion.   Lymphadenopathy:      Cervical: No cervical adenopathy.   Skin:     General: Skin is warm.      Capillary Refill: Capillary refill takes less than 2 seconds.      Comments: Erythematous macules scattered throughout the trunk and extremities   Neurological:      General: No focal deficit present.      Mental Status: He is alert.   Psychiatric:         Mood and Affect: Mood normal.         Behavior: Behavior normal.         Diagnostic Test Results:  Labs reviewed in [facility]    Last Comprehensive Metabolic Panel:  Sodium   Date Value Ref Range Status   01/13/2021 142 133 - 144 mmol/L Final     Potassium   Date Value Ref Range Status   01/13/2021 4.9 3.4 - 5.3 mmol/L Final     Chloride   Date Value Ref Range Status   01/13/2021 109 94 - 109 mmol/L Final     Carbon Dioxide   Date " Value Ref Range Status   01/13/2021 29 20 - 32 mmol/L Final     Anion Gap   Date Value Ref Range Status   01/13/2021 4 3 - 14 mmol/L Final     Glucose   Date Value Ref Range Status   01/13/2021 102 (H) 70 - 99 mg/dL Final     Comment:     Fasting specimen     Urea Nitrogen   Date Value Ref Range Status   01/13/2021 21 7 - 30 mg/dL Final     Creatinine   Date Value Ref Range Status   01/13/2021 1.24 0.66 - 1.25 mg/dL Final     GFR Estimate   Date Value Ref Range Status   01/13/2021 78 >60 mL/min/[1.73_m2] Final     Comment:     Non  GFR Calc  Starting 12/18/2018, serum creatinine based estimated GFR (eGFR) will be   calculated using the Chronic Kidney Disease Epidemiology Collaboration   (CKD-EPI) equation.       Calcium   Date Value Ref Range Status   01/13/2021 9.6 8.5 - 10.1 mg/dL Final     Hemoglobin A1C POCT   Date Value Ref Range Status   01/13/2021 5.0 0 - 5.6 % Final     Comment:     Normal <5.7% Prediabetes 5.7-6.4%  Diabetes 6.5% or higher - adopted from ADA   consensus guidelines.         ASSESSMENT/PLAN:   Chao was seen today for physical.    Diagnoses and all orders for this visit:    Routine general medical examination at a health care facility  -     REVIEW OF HEALTH MAINTENANCE PROTOCOL ORDERS    CKD (chronic kidney disease) stage 2, GFR 60-89 ml/min  New diagnosis, minimize nephrotoxic agents such as ibuprofen as able.  Encourage increasing fluid intake as he often forgets to drink water throughout the day, recheck metabolic panel in 2 to 4 weeks  -     Basic metabolic panel  (Ca, Cl, CO2, Creat, Gluc, K, Na, BUN); Future    Folliculitis  Ongoing issue, previously prescribed topicals which she does not like using.  Recommend trial of oral doxycycline.  -     doxycycline hyclate (VIBRAMYCIN) 100 MG capsule; Take 1 capsule (100 mg) by mouth 2 times daily for 28 days    Snoring  Patient may benefit from a polysomnography, decision per sleep medicine, referral placed, appreciate  "assistance.  -     Adult Sleep Eval & Management  Referral; Future    Anxiety and depression  Controlled, continue current medical management.  -     citalopram (CELEXA) 20 MG tablet; Take 1 tablet (20 mg) by mouth daily    Morbid obesity (H)  Discussed and reviewed several strategies including medically supervised weight management, patient appreciative and will reach out back to us.      Patient has been advised of split billing requirements and indicates understanding: Yes    COUNSELING:   Reviewed preventive health counseling, as reflected in patient instructions       Regular exercise       Healthy diet/nutrition    Estimated body mass index is 43.9 kg/m  as calculated from the following:    Height as of this encounter: 1.74 m (5' 8.5\").    Weight as of this encounter: 132.9 kg (293 lb).     Weight management plan: Discussed healthy diet and exercise guidelines    He reports that he has never smoked. His smokeless tobacco use includes chew.  Tobacco Cessation Action Plan:   Information offered: Patient not interested at this time      Counseling Resources:  ATP IV Guidelines  Pooled Cohorts Equation Calculator  FRAX Risk Assessment  ICSI Preventive Guidelines  Dietary Guidelines for Americans, 2010  USDA's MyPlate  ASA Prophylaxis  Lung CA Screening    Frandy Sanchez MD  [facility]  "

## 2022-04-26 DIAGNOSIS — L73.9 FOLLICULITIS: ICD-10-CM

## 2022-04-26 RX ORDER — DOXYCYCLINE 100 MG/1
100 CAPSULE ORAL 2 TIMES DAILY
Qty: 56 CAPSULE | Refills: 0 | OUTPATIENT
Start: 2022-04-26

## 2022-04-26 NOTE — TELEPHONE ENCOUNTER
Doxycycline, not meant to be chronic therapy.  If he still having issues, will want a repeat evaluation or have him referred over to dermatology.  Please let patient know.    Best regards,     Frandy Sanchez MD

## 2022-06-29 ENCOUNTER — OFFICE VISIT (OUTPATIENT)
Dept: FAMILY MEDICINE | Facility: CLINIC | Age: 32
End: 2022-06-29
Payer: COMMERCIAL

## 2022-06-29 VITALS
OXYGEN SATURATION: 97 % | BODY MASS INDEX: 41.31 KG/M2 | HEART RATE: 97 BPM | DIASTOLIC BLOOD PRESSURE: 76 MMHG | WEIGHT: 278.9 LBS | TEMPERATURE: 99.1 F | SYSTOLIC BLOOD PRESSURE: 136 MMHG | RESPIRATION RATE: 18 BRPM | HEIGHT: 69 IN

## 2022-06-29 DIAGNOSIS — L98.9 SKIN LESION: Primary | ICD-10-CM

## 2022-06-29 DIAGNOSIS — F32.A ANXIETY AND DEPRESSION: ICD-10-CM

## 2022-06-29 DIAGNOSIS — F41.9 ANXIETY AND DEPRESSION: ICD-10-CM

## 2022-06-29 PROCEDURE — 99214 OFFICE O/P EST MOD 30 MIN: CPT | Performed by: FAMILY MEDICINE

## 2022-06-29 RX ORDER — CITALOPRAM HYDROBROMIDE 20 MG/1
40 TABLET ORAL DAILY
Qty: 60 TABLET | Refills: 4 | COMMUNITY
Start: 2022-06-29 | End: 2022-07-13

## 2022-06-29 ASSESSMENT — ENCOUNTER SYMPTOMS
UNEXPECTED WEIGHT CHANGE: 0
NERVOUS/ANXIOUS: 1

## 2022-06-29 NOTE — PROGRESS NOTES
Assessment & Plan     Skin lesion  Patient has a couple skin lesions on his left pectoralis which has been there over 12 years without preceding event or obvious cause.  No previous acne over the lesion.  I would like a dermatologist's input on these 2 lesions as it does have some small telangiectasia.  Additionally, he has 3 acrochordons located on his left neck, left axilla and right upper inner thigh, occasionally has become irritated.  I will schedule patient for elective removal, discussed risk benefits and alternatives.  Discussed monitoring, symptom control versus removal, patient would like to have it removed.  Discussed potential bleeding, pain and infection, patient voices understanding.    - Adult Dermatology Referral    Anxiety and depression  Uncontrolled, patient already self increased to 40 mg which I think is appropriate.  If not better after the next 2 weeks, consider increasing Celexa to 60 mg.  Patient will reach out to me at that point.  - citalopram (CELEXA) 20 MG tablet  Dispense: 60 tablet; Refill: 4        Return in about 2 weeks (around 7/13/2022) for skin procedure.    Frandy Sanchez MD  [facility]    Blaine Magallanes is a 31 year old, presenting for the following health issues:  Recheck Medication      History of Present Illness       Reason for visit:  Med check and skin tag/skin concerns  Symptom onset:  More than a month  Symptoms include:  Skin tags and bumps  Symptom intensity:  Mild  Symptom progression:  Staying the same  Had these symptoms before:  Yes  Has tried/received treatment for these symptoms:  No    He eats 0-1 servings of fruits and vegetables daily.He consumes 2 sweetened beverage(s) daily.He exercises with enough effort to increase his heart rate 10 to 19 minutes per day.  He exercises with enough effort to increase his heart rate 3 or less days per week.   He is taking medications regularly.     Patient presents for concerns of anxiety and  "skin lesions.    Review of Systems   Constitutional: Negative for unexpected weight change.   Psychiatric/Behavioral: The patient is nervous/anxious.             Objective    /76   Pulse 97   Temp 99.1  F (37.3  C) (Oral)   Resp 18   Ht 1.74 m (5' 8.5\")   Wt 126.5 kg (278 lb 14.4 oz)   SpO2 97%   BMI 41.79 kg/m    Body mass index is 41.79 kg/m .  Physical Exam  Skin:     Comments: Left neck, 0.5 cm skin colored lesion.  Left axilla, 1.0 cm skin colored lesion.  Right upper inner thigh, 0.5 cm skin colored lesion.  All 3 preceding lesions have a narrow central stalk.      Two separate 1 x 0.5 cm firm nodular oval lesion on the left pectoralis, surface telangiectasia   Psychiatric:      Comments: anxious                            .  ..  "

## 2022-07-12 ENCOUNTER — MYC MEDICAL ADVICE (OUTPATIENT)
Dept: FAMILY MEDICINE | Facility: CLINIC | Age: 32
End: 2022-07-12

## 2022-07-12 DIAGNOSIS — F32.A ANXIETY AND DEPRESSION: ICD-10-CM

## 2022-07-12 DIAGNOSIS — F41.9 ANXIETY AND DEPRESSION: ICD-10-CM

## 2022-07-12 NOTE — TELEPHONE ENCOUNTER
"Please see pt MyChart message regarding citalopram (CELEXA) 20 MG tablet. Pt was seen with OV on 6/29/22, per chart review: \"Anxiety and depression  Uncontrolled, patient already self increased to 40 mg which I think is appropriate.  If not better after the next 2 weeks, consider increasing Celexa to 60 mg.  Patient will reach out to me at that point.\"     Jose MEDLEY RN    "

## 2022-07-13 DIAGNOSIS — F32.A ANXIETY AND DEPRESSION: ICD-10-CM

## 2022-07-13 DIAGNOSIS — F41.9 ANXIETY AND DEPRESSION: ICD-10-CM

## 2022-07-14 RX ORDER — CITALOPRAM HYDROBROMIDE 40 MG/1
40 TABLET ORAL DAILY
Qty: 90 TABLET | Refills: 0 | Status: SHIPPED | OUTPATIENT
Start: 2022-07-14 | End: 2022-10-06

## 2022-07-19 ENCOUNTER — OFFICE VISIT (OUTPATIENT)
Dept: SLEEP MEDICINE | Facility: CLINIC | Age: 32
End: 2022-07-19
Attending: FAMILY MEDICINE
Payer: COMMERCIAL

## 2022-07-19 VITALS
HEART RATE: 77 BPM | BODY MASS INDEX: 41.95 KG/M2 | SYSTOLIC BLOOD PRESSURE: 136 MMHG | WEIGHT: 283.2 LBS | OXYGEN SATURATION: 97 % | DIASTOLIC BLOOD PRESSURE: 86 MMHG | HEIGHT: 69 IN

## 2022-07-19 DIAGNOSIS — R06.83 SNORING: ICD-10-CM

## 2022-07-19 DIAGNOSIS — G47.33 OSA (OBSTRUCTIVE SLEEP APNEA): Primary | ICD-10-CM

## 2022-07-19 PROCEDURE — 99205 OFFICE O/P NEW HI 60 MIN: CPT | Performed by: INTERNAL MEDICINE

## 2022-07-19 NOTE — NURSING NOTE
"Chief Complaint   Patient presents with     Sleep Problem     Excessive snoring and poor sleep overall       Initial /86   Pulse 77   Ht 1.753 m (5' 9\")   Wt 128.5 kg (283 lb 3.2 oz)   SpO2 97%   BMI 41.82 kg/m   Estimated body mass index is 41.82 kg/m  as calculated from the following:    Height as of this encounter: 1.753 m (5' 9\").    Weight as of this encounter: 128.5 kg (283 lb 3.2 oz).    Medication Reconciliation: complete  ESS:18  Neck circumference: 48 centimeters.    Sigifredo Horn CNA    "

## 2022-07-19 NOTE — PROGRESS NOTES
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 We discussed the pathophysiology of LIONEL including the cardio-neurologic complications of untreated significant LIONEL including the treatment options including CPAP, oral appliances and positional training.  Given his significant obesity and logistic issues (kids at home), an in-lab sleep study with TCM is ideal and I will set him up for one.      Hypertension  Not currently on medications and we discussed LIONEL and hypertension. I explained to him that using CPAP may not be enough to adequately treat his hypertension and he may need medications.          Summary Recommendations:         Orders Placed This Encounter   Procedures     Comprehensive Sleep Study       Summary Counseling:  See instructions    Counseling included a comprehensive review of diagnostic and therapeutic strategies as well as risks of inadequate therapy.  Educational materials provided in instructions.             History of Present Illness:     I had the pleasure of seeing Chao Pruett, who is a 31 year old male with a history of hypertension and undiagnosed LIONEL on CPAP who presents for an evaluation of same.  To briefly review, Elpidio describes symptoms of very loud snoring, witness apneas, daytime sleepiness and nocturnal choking over the last 5 years. These symptoms became bothersome  since getting  about 6 years ago and were very bothersome to his wife. He started using his grandfather's CPAP and noticed interval improvement with some of these symptoms and improved sleep quality. However, more lately, the CPAP doesn't appear to be as effective and appears to be worn out as it is a very old device.  He has also gained considerable amount of weight and likes to sleep in the supine position. He also has early morning headaches upon awakening.    SLEEP-WAKE SCHEDULE:     - Doesn't take naps    -ON WEEKDAYS, goes to sleep at 9:00 PM during the week; awakens  5:30 AM without an alarm; falls asleep in 30 minutes; denies difficulty falling asleep.     -ON WEEKENDS, has the same schedule    -Awakens 1-2 times a night for 5 minutes before falling back to sleep; awakens to go to the bathroom         SCALES       SLEEP APNEA: Stopbang score - 8       INSOMNIA:  Insomnia severity score:        SLEEPINESS: [place] sleepiness scale: 18 [normal < 11]   Drowsy driving/near accidents -No  Consequences: No    SLEEP COMPLAINTS:  Cardio-respiratory    Snoring-5 /week  Dyspnea- denies  Morning headaches or confusiondenies  Coexisting Lung disease: denies    Coexisting Heart disease: denies    Does patient have a bed partner: yes  Has bed partner been sleeping separately because of snoring:  yes            RLS Screen: When you try to relax in the evening or sleep at  night, do you ever have unpleasant, restless feelings in your  legs that can be relieved by walking or movement?denies    Periodic limb movement: denies    Narcolepsy:  denies sudden urges of sleep attacks    Cataplexy: denies     Sleep paralysis: denies      Hallucinations:   denies       Sleep Behaviors:    Leg symptoms/movements: denies    Motor restlessness:denies    Night terrors: denies    Bruxism:denies    Automatic behaviors: none    Other subjective complaints:    Anxiety or rumination- yes    Pain and discomfort at  night:  denies    Waking up with heart pounding or racing: denies    GERD or aspiration:Yes         Parasomnia:   NREM - denies recurrent persistent confusional arousal, night eating, sleep walking or sleep terrors   REM  -denies dream enactment; injuries   Safety: No issues             Medications:     Current Outpatient Medications   Medication Sig     citalopram (CELEXA) 40 MG tablet Take 1 tablet (40 mg) by mouth daily     hydrOXYzine (ATARAX) 25 MG tablet Take 1 tablet (25 mg) by mouth 3 times daily as needed for anxiety     omeprazole (PRILOSEC) 20 MG DR capsule Take 20 mg by mouth daily     No current facility-administered medications for this visit.        Allergies   Allergen Reactions     No Known Drug Allergy      Seasonal Allergies             Past Medical History:     Does not need 02 supplement at night   Past Medical History:   Diagnosis Date     History of hypertension              Past Surgical History:    Previous upper airway surgery - None  Past Surgical History:   Procedure Laterality Date     APPENDECTOMY       ESOPHAGOSCOPY, GASTROSCOPY, DUODENOSCOPY (EGD), COMBINED N/A 7/23/2021    Procedure: ESOPHAGOGASTRODUODENOSCOPY with biopsies using biopsy forceps;  Surgeon: Sharan Albright MD;  Location: [facility]     FRACTURE TX, WRIST RT/LT      LT - hardware removed     HC OPEN TX TIBIAL SHAFT FX W PLATE/SCREWS W/WO CERCLAGE  2008            Social History:     Social History     Tobacco Use     Smoking status: Never Smoker     Smokeless tobacco: Current User     Types: Chew     Tobacco comment: 3-4x/day   Substance Use Topics     Alcohol use: Yes     Comment: occasionally, 1 a month         Chemical History:     Tobacco:  As above     Uses 2 cups/day of coffee. Last caffeine intake is usually before 3 pm    Supplements for wakefulness:     EtOH:  None of the patient's responses to the CAGE screening were positive / Negative CAGE score  Recreational Drugs:      Psych Hx:   PHQ9   Current dangers to self or  "others:none           Family History:     Family History   Problem Relation Age of Onset     Hypertension Mother      Lipids Mother      Anxiety Disorder Mother      Depression Mother      Hypertension Father      Lipids Father      Cancer Maternal Grandmother         ovarian     Diabetes Maternal Grandfather      Lipids Paternal Grandfather      No Known Problems Sister         Sleep Family Hx:        RLS-    LIONEL -   Insomnia -   Parasomnia -          Review of Systems:     A complete 10 point review of systems was negative other than HPI or as commented below:   Chao Pruett has gained 10-15 pounds in the last year.             Physical Examination:     /86   Pulse 77   Ht 1.753 m (5' 9\")   Wt 128.5 kg (283 lb 3.2 oz)   SpO2 97%   BMI 41.82 kg/m    Exam:  Constitutional: healthy, alert and no distress  Head: Normocephalic. No masses, lesions, tenderness or abnormalities  ENT: ENT exam normal, no neck nodes or sinus tenderness  Cardiovascular: negative, PMI normal. No lifts, heaves, or thrills. RRR. No murmurs, clicks gallops or rub  Respiratory: negative, Percussion normal. Good diaphragmatic excursion. Lungs clear  Gastrointestinal: Abdomen soft, non-tender. BS normal. No masses, organomegaly  : Deferred  Musculoskeletal: extremities normal- no gross deformities noted, gait normal and normal muscle tone  Skin: no suspicious lesions or rashes  Neurologic: Gait normal. Reflexes normal and symmetric. Sensation grossly WNL.  Psychiatric: mentation appears normal and affect normal/bright            Data: All pertinent previous laboratory data reviewed     No results found for: PH, PHARTERIAL, PO2, FX0LOGPCYML, SAT, PCO2, HCO3, BASEEXCESS, ABBY, BEB  Lab Results   Component Value Date    TSH 0.57 09/01/2017    TSH 1.04 10/15/2009     Lab Results   Component Value Date     (H) 01/13/2021     (H) 02/19/2020     Lab Results   Component Value Date    HGB 16.2 01/13/2021    HGB 16.7 02/19/2020 "     Lab Results   Component Value Date    BUN 21 01/13/2021    BUN 15 02/19/2020    CR 1.24 01/13/2021    CR 1.22 02/19/2020     Lab Results   Component Value Date    AST 40 01/13/2021    AST 62 (H) 02/19/2020     (H) 01/13/2021     (H) 02/19/2020    ALKPHOS 91 01/13/2021    ALKPHOS 82 02/19/2020    BILITOTAL 0.8 01/13/2021    BILITOTAL 1.0 02/19/2020    BILICONJ 0.0 08/13/2007     No results found for: UAMP, UBARB, BENZODIAZEUR, UCANN, UCOC, OPIT, UPCP      Copy to: Frandy Sanchez MD 7/19/2022   RTC 2 weeks after study    I spent a total of 60 minutes face to face with Chao Pruett during today's office visit. Over 50% of this time was spent counseling the patient and/or coordinating care

## 2022-07-20 ENCOUNTER — OFFICE VISIT (OUTPATIENT)
Dept: FAMILY MEDICINE | Facility: CLINIC | Age: 32
End: 2022-07-20
Payer: COMMERCIAL

## 2022-07-20 VITALS
BODY MASS INDEX: 41.77 KG/M2 | HEIGHT: 69 IN | TEMPERATURE: 98.6 F | WEIGHT: 282 LBS | OXYGEN SATURATION: 98 % | HEART RATE: 94 BPM | DIASTOLIC BLOOD PRESSURE: 84 MMHG | SYSTOLIC BLOOD PRESSURE: 142 MMHG | RESPIRATION RATE: 16 BRPM

## 2022-07-20 DIAGNOSIS — L91.8 ACROCHORDON: Primary | ICD-10-CM

## 2022-07-20 PROCEDURE — 11200 RMVL SKIN TAGS UP TO&INC 15: CPT | Performed by: FAMILY MEDICINE

## 2022-07-20 RX ORDER — CEPHALEXIN 500 MG/1
500 CAPSULE ORAL 2 TIMES DAILY
Qty: 14 CAPSULE | Refills: 0 | Status: SHIPPED | OUTPATIENT
Start: 2022-07-20 | End: 2022-07-27

## 2022-07-20 ASSESSMENT — ANXIETY QUESTIONNAIRES
GAD7 TOTAL SCORE: 5
GAD7 TOTAL SCORE: 5
7. FEELING AFRAID AS IF SOMETHING AWFUL MIGHT HAPPEN: NOT AT ALL
2. NOT BEING ABLE TO STOP OR CONTROL WORRYING: SEVERAL DAYS
5. BEING SO RESTLESS THAT IT IS HARD TO SIT STILL: SEVERAL DAYS
6. BECOMING EASILY ANNOYED OR IRRITABLE: SEVERAL DAYS
4. TROUBLE RELAXING: NOT AT ALL
IF YOU CHECKED OFF ANY PROBLEMS ON THIS QUESTIONNAIRE, HOW DIFFICULT HAVE THESE PROBLEMS MADE IT FOR YOU TO DO YOUR WORK, TAKE CARE OF THINGS AT HOME, OR GET ALONG WITH OTHER PEOPLE: NOT DIFFICULT AT ALL
7. FEELING AFRAID AS IF SOMETHING AWFUL MIGHT HAPPEN: NOT AT ALL
8. IF YOU CHECKED OFF ANY PROBLEMS, HOW DIFFICULT HAVE THESE MADE IT FOR YOU TO DO YOUR WORK, TAKE CARE OF THINGS AT HOME, OR GET ALONG WITH OTHER PEOPLE?: NOT DIFFICULT AT ALL
3. WORRYING TOO MUCH ABOUT DIFFERENT THINGS: SEVERAL DAYS
1. FEELING NERVOUS, ANXIOUS, OR ON EDGE: SEVERAL DAYS
GAD7 TOTAL SCORE: 5

## 2022-07-20 ASSESSMENT — PATIENT HEALTH QUESTIONNAIRE - PHQ9
SUM OF ALL RESPONSES TO PHQ QUESTIONS 1-9: 2
SUM OF ALL RESPONSES TO PHQ QUESTIONS 1-9: 2
10. IF YOU CHECKED OFF ANY PROBLEMS, HOW DIFFICULT HAVE THESE PROBLEMS MADE IT FOR YOU TO DO YOUR WORK, TAKE CARE OF THINGS AT HOME, OR GET ALONG WITH OTHER PEOPLE: NOT DIFFICULT AT ALL

## 2022-07-20 NOTE — PROGRESS NOTES
"  Assessment & Plan     Acrochordon  Verbal consent obtained, patient voices understanding for bleeding pain and infection.  The 4 area's described below were anesthetized with 1% lidocaine with epinephrine.  Area prepped in the sterile fashion with Betadine, excised using sterile scissors at the stalk.  Adequate hemostasis with pressure.  In the right upper thigh, hemostasis obtained using silver nitrate.  All 4 sites covered with bandage and bacitracin.  Standard precautions for return were given including worsening pain, fever, redness, and discharge.  Tolerated procedure well, EBL less than 1 mL.  Start Keflex for infection prophylaxis.  - cephALEXin (KEFLEX) 500 MG capsule  Dispense: 14 capsule; Refill: 0  - Removal of up to 15 skin tags          Return in about 1 week (around 7/27/2022) for If symptoms do not improve or gets worse..    Frandy Sanchez MD  [facility] ELAINE Magallanes is a 31 year old, presenting for the following health issues:  Derm Problem (Skin tag removal)      History of Present Illness       Reason for visit:  Skin tag removal    He eats 0-1 servings of fruits and vegetables daily.He consumes 1 sweetened beverage(s) daily.He exercises with enough effort to increase his heart rate 20 to 29 minutes per day.  He exercises with enough effort to increase his heart rate 5 days per week.   He is taking medications regularly.    Today's PHQ-9         PHQ-9 Total Score: 2    PHQ-9 Q9 Thoughts of better off dead/self-harm past 2 weeks :   Not at all    How difficult have these problems made it for you to do your work, take care of things at home, or get along with other people: Not difficult at all  Today's GREYSON-7 Score: 5         Review of Systems         Objective    BP (!) 142/84 (Cuff Size: Adult Large)   Pulse 94   Temp 98.6  F (37  C)   Resp 16   Ht 1.753 m (5' 9\")   Wt 127.9 kg (282 lb)   SpO2 98%   BMI 41.64 kg/m    Body mass index is 41.64 kg/m .  Physical " Exam  Skin:            Comments: Left neck: 0.5 cm skin colored lesion.     Left axilla: 1x < 0.5 cm skin colored lesion. 1x 0.5cm skin colored lesion    Right upper thigh, 1 cm skin colored lesion                            .  ..

## 2022-09-09 ENCOUNTER — TRANSFERRED RECORDS (OUTPATIENT)
Dept: HEALTH INFORMATION MANAGEMENT | Facility: CLINIC | Age: 32
End: 2022-09-09

## 2022-09-21 ENCOUNTER — LAB (OUTPATIENT)
Dept: URGENT CARE | Facility: URGENT CARE | Age: 32
End: 2022-09-21
Payer: COMMERCIAL

## 2022-09-21 DIAGNOSIS — R06.83 SNORING: ICD-10-CM

## 2022-09-21 DIAGNOSIS — G47.33 OSA (OBSTRUCTIVE SLEEP APNEA): ICD-10-CM

## 2022-09-21 PROCEDURE — U0003 INFECTIOUS AGENT DETECTION BY NUCLEIC ACID (DNA OR RNA); SEVERE ACUTE RESPIRATORY SYNDROME CORONAVIRUS 2 (SARS-COV-2) (CORONAVIRUS DISEASE [COVID-19]), AMPLIFIED PROBE TECHNIQUE, MAKING USE OF HIGH THROUGHPUT TECHNOLOGIES AS DESCRIBED BY CMS-2020-01-R: HCPCS

## 2022-09-21 PROCEDURE — U0005 INFEC AGEN DETEC AMPLI PROBE: HCPCS

## 2022-09-22 LAB — SARS-COV-2 RNA RESP QL NAA+PROBE: NEGATIVE

## 2022-09-23 ENCOUNTER — THERAPY VISIT (OUTPATIENT)
Dept: SLEEP MEDICINE | Facility: CLINIC | Age: 32
End: 2022-09-23
Payer: COMMERCIAL

## 2022-09-23 DIAGNOSIS — G47.33 OSA (OBSTRUCTIVE SLEEP APNEA): ICD-10-CM

## 2022-09-23 DIAGNOSIS — R06.83 SNORING: ICD-10-CM

## 2022-09-23 PROCEDURE — 95810 POLYSOM 6/> YRS 4/> PARAM: CPT | Performed by: INTERNAL MEDICINE

## 2022-09-24 ENCOUNTER — E-VISIT (OUTPATIENT)
Dept: FAMILY MEDICINE | Facility: CLINIC | Age: 32
End: 2022-09-24
Payer: COMMERCIAL

## 2022-09-24 DIAGNOSIS — K64.4 EXTERNAL HEMORRHOIDS: Primary | ICD-10-CM

## 2022-09-24 PROCEDURE — 99421 OL DIG E/M SVC 5-10 MIN: CPT | Performed by: FAMILY MEDICINE

## 2022-09-24 NOTE — PATIENT INSTRUCTIONS
[facility]    1. Your sleep study will be reviewed by a sleep physician within the next few days.     2. Please follow up in the sleep clinic as scheduled, or, make an appointment with your sleep provider to be seen within two weeks to discuss the results of the sleep study.    3. If you have any questions or problems with your treatment plan, please contact your sleep clinic provider at [phone] to further manage your condition.    4. Please review your attached medication list, and, at your follow-up appointment advise your sleep clinic provider about any changes.    5. Go to [email or web address] for more information about your sleep problems.    Ailyn Hamilton, RPSGT  September 24, 2022

## 2022-09-26 RX ORDER — POLYETHYLENE GLYCOL 3350 17 G/17G
17 POWDER, FOR SOLUTION ORAL DAILY
Qty: 510 G | Refills: 0 | Status: SHIPPED | OUTPATIENT
Start: 2022-09-26 | End: 2023-12-05

## 2022-09-26 RX ORDER — HYDROCORTISONE 25 MG/G
CREAM TOPICAL 2 TIMES DAILY PRN
Qty: 30 G | Refills: 0 | Status: SHIPPED | OUTPATIENT
Start: 2022-09-26 | End: 2023-12-05

## 2022-09-26 NOTE — PATIENT INSTRUCTIONS
Thank you for choosing us for your care. I have placed an order for a prescription so that you can start treatment. View your full visit summary for details by clicking on the link below. Your pharmacist will able to address any questions you may have about the medication.     If you're not feeling better within 5-7 days, please schedule an appointment.  You can schedule an appointment right here in [facility], or call [phone]  If the visit is for the same symptoms as your eVisit, we'll refund the cost of your eVisit if seen within seven days.

## 2022-10-04 ENCOUNTER — DOCUMENTATION ONLY (OUTPATIENT)
Dept: SLEEP MEDICINE | Facility: CLINIC | Age: 32
End: 2022-10-04
Payer: COMMERCIAL

## 2022-10-04 DIAGNOSIS — G47.33 OSA (OBSTRUCTIVE SLEEP APNEA): ICD-10-CM

## 2022-10-04 NOTE — PROCEDURES
" SLEEP STUDY INTERPRETATION  DIAGNOSTIC POLYSOMNOGRAPHY REPORT      Patient: BHASKAR AMARAL  YOB: 1990  Study Date: 9/23/2022  MRN: [id number]  Referring Provider: MD Sanchez Zhen H.  Ordering Provider: MD Beach Bimaje    Indications for Polysomnography: The patient is a 31 year old Male who is 5' 9\" and weighs 283.0 lbs. His BMI is 41.9, [place] sleepiness scale 18 and neck circumference is 48 cm cm. Relevant medical history includes hypertension A diagnostic polysomnogram was performed to evaluate for sleep apnea/ hypoventilation/hypoxemia.    Polysomnogram Data: A full night polysomnogram recorded the standard physiologic parameters including EEG, EOG, EMG, ECG, nasal and oral airflow. Respiratory parameters of chest and abdominal movements were recorded with respiratory inductance plethysmography. Oxygen saturation was recorded by pulse oximetry. Hypopnea scoring rule used: 1B 4%.    Sleep Architecture: Summary assessment  The total recording time of the polysomnogram was 364.9 minutes. The total sleep time was 311.0 minutes. Sleep latency was decreased at 2.0 minutes without the use of a sleep aid. REM latency was 125.0 minutes. Arousal index was normal at 22.6 arousals per hour. Sleep efficiency was normal at 85.2%. Wake after sleep onset was 51.5 minutes. The patient spent 15.6% of total sleep time in Stage N1, 51.0% in Stage N2, 20.6% in Stage N3, and 12.9% in REM. Time in REM supine was 6.5 minutes.    Respiration: Summary assessment    Events ? The polysomnogram revealed a presence of 0 obstructive, 0 central, and 0 mixed apneas resulting in an apnea index of 0 events per hour. There were 0 obstructive hypopneas and 0 central hypopneas resulting in an obstructive hypopnea index of 0 and central hypopnea index of 0 events per hour. The combined apnea/hypopnea index was 0 events per hour (central apnea/hypopnea index was 0 events per hour). The REM AHI was 0 events per hour. The supine AHI " was 0 events per hour. The RERA index was 3.9 events per hour.  The RDI was 3.9 events per hour.    Snoring - was reported as absent    Respiratory rate and pattern - was notable for normal respiratory rate and pattern.    Sustained Sleep Associated Hypoventilation - Transcutaneous carbon dioxide monitoring was used, however significant hypoventilation was not present with a maximum change from 33.1 to 46.2 mmHg and 0 minutes at or greater than 55 mmHg.    Sleep Associated Hypoxemia - (Greater than 5 minutes O2 sat at or below 88%) was not present. Baseline oxygen saturation was 94.8%. Lowest oxygen saturation was 92.0%. Time spent less than or equal to 88% was 0 minutes. Time spent less than or equal to 89% was 0 minutes.    Movement Activity: Summary assessment    Periodic Limb Activity - There were 63 PLMs during the entire study. The PLM index was 12.2 movements per hour. The PLM Arousal Index was 2.9 per hour.    REM EMG Activity - Excessive transient was not present.    Nocturnal Behavior - Abnormal sleep related behaviors were not noted.    Bruxism - None apparent.    Cardiac Summary: Summary assessment  The average pulse rate was 52.3 bpm. The minimum pulse rate was 39.0 bpm while the maximum pulse rate was 82.0 bpm.  Arrhythmias were/were not noted.      Assessment:     This study did not show any clinically significant sleep disordered breathing.     There was periodic limb movements which were not associated with significant sleep arousals.    Recommendations:    Advice regarding the risks of drowsy driving.    Suggest optimizing sleep schedule and avoiding sleep deprivation.    Weight management (if BMI > 30).    Pharmacologic therapy should be used for management of restless legs syndrome only if present and clinically indicated and not based on the presence of periodic limb movements alone.    Diagnostic Codes:   Obstructive Sleep Apnea G47.33  Sleep Hypoxemia/Hypoventilation G47.36     9/23/2022  [place] Diagnostic Sleep Study (283.0 lbs) - AHI -, RDI 3.9, Supine AHI -, REM AHI -, Low O2 92.0%, Time Spent ?88% 0 minutes / Time Spent ?89% 0 minutes.    Attending MD: PSG was personally reviewed in detail with the Sleep Medicine Fellow.  The above interpretation reflects our joint assessment and recommendations.   _____________________________________   Electronically Signed By: (Leana feliciano MD (10/4/2022)           Range(%) Time in range (min)   0.0 - 89.0 -   0.0 - 88.0 -         Stage Min(mm Hg) Max(mm Hg)   Wake 36.1 45.5   NREM(1+2+3) 33.1 46.2   REM 36.6 46.2       Range(mmHg) Time in range (min)   55.0 - 100.0 -   Excluded data <20.0 & >65.0 53.0

## 2022-10-05 LAB — SLPCOMP: NORMAL

## 2022-10-06 DIAGNOSIS — F41.9 ANXIETY AND DEPRESSION: ICD-10-CM

## 2022-10-06 DIAGNOSIS — F32.A ANXIETY AND DEPRESSION: ICD-10-CM

## 2022-10-06 RX ORDER — CITALOPRAM HYDROBROMIDE 40 MG/1
40 TABLET ORAL DAILY
Qty: 90 TABLET | Refills: 1 | Status: SHIPPED | OUTPATIENT
Start: 2022-10-06 | End: 2023-10-05

## 2022-10-06 NOTE — TELEPHONE ENCOUNTER
Prescription approved per [facility] Refill Protocol.  Aleksandra Chen RN      right

## 2022-10-15 ENCOUNTER — HEALTH MAINTENANCE LETTER (OUTPATIENT)
Age: 32
End: 2022-10-15

## 2022-10-25 ENCOUNTER — OFFICE VISIT (OUTPATIENT)
Dept: SLEEP MEDICINE | Facility: CLINIC | Age: 32
End: 2022-10-25
Payer: COMMERCIAL

## 2022-10-25 VITALS
OXYGEN SATURATION: 97 % | HEART RATE: 84 BPM | WEIGHT: 274 LBS | SYSTOLIC BLOOD PRESSURE: 150 MMHG | HEIGHT: 69 IN | DIASTOLIC BLOOD PRESSURE: 86 MMHG | BODY MASS INDEX: 40.58 KG/M2

## 2022-10-25 DIAGNOSIS — R06.83 SNORING: Primary | ICD-10-CM

## 2022-10-25 PROCEDURE — 99215 OFFICE O/P EST HI 40 MIN: CPT | Performed by: INTERNAL MEDICINE

## 2022-10-25 NOTE — PROGRESS NOTES
Sleep Study Follow Up Visit    Chao Pruett was seen in our clinic for a follow up for:   He was last seen in my clinic on 7/19/2022 for complaints of early morning headaches, daytime sleepiness, loud snoring and witnessed apneas for 5 years.  He then had a sleep study on 9/23/2022, for evaluation of sleep apnea/ hypoventilation/hypoxemia  The study revealed the presence of:  Respiration: Summary assessment    Events ? The polysomnogram revealed a presence of 0 obstructive, 0 central, and 0 mixed apneas resulting in an apnea index of 0 events per hour. There were 0 obstructive hypopneas and 0 central hypopneas resulting in an obstructive hypopnea index of 0 and central hypopnea index of 0 events per hour. The combined apnea/hypopnea index was 0 events per hour (central apnea/hypopnea index was 0 events per hour). The REM AHI was 0 events per hour. The supine AHI was 0 events per hour. The RERA index was 3.9 events per hour.  The RDI was 3.9 events per hour.    Snoring - was reported as absent    Respiratory rate and pattern - was notable for normal respiratory rate and pattern.    Sustained Sleep Associated Hypoventilation - Transcutaneous carbon dioxide monitoring was used, however significant hypoventilation was not present with a maximum change from 33.1 to 46.2 mmHg and 0 minutes at or greater than 55 mmHg.    Sleep Associated Hypoxemia - (Greater than 5 minutes O2 sat at or below 88%) was not present. Baseline oxygen saturation was 94.8%. Lowest oxygen saturation was 92.0%. Time spent less than or equal to 88% was 0 minutes. Time spent less than or equal to 89% was 0 minutes.     Movement Activity: Summary assessment    Periodic Limb Activity - There were 63 PLMs during the entire study. The PLM index was 12.2 movements per hour. The PLM Arousal Index was 2.9 per hour.    REM EMG Activity - Excessive transient was not present.    Nocturnal Behavior - Abnormal sleep  "related behaviors were not noted.    Bruxism - None apparent.     Cardiac Summary: Summary assessment  The average pulse rate was 52.3 bpm. The minimum pulse rate was 39.0 bpm while the maximum pulse rate was 82.0 bpm.  Arrhythmias were/were not noted.     REVIEW OF SYSTEMS: Today, detailed review of systems was not done, except as described above.   Since his last visit there has been no significant change in his overall health.   Past Medical History:   Diagnosis Date     History of hypertension      Patient Active Problem List   Diagnosis     Anxiety     Elevated blood pressure reading without diagnosis of hypertension     BMI 40.0-44.9, adult (H)     Past Surgical History:   Procedure Laterality Date     APPENDECTOMY       ESOPHAGOSCOPY, GASTROSCOPY, DUODENOSCOPY (EGD), COMBINED N/A 7/23/2021    Procedure: ESOPHAGOGASTRODUODENOSCOPY with biopsies using biopsy forceps;  Surgeon: Sharan Albright MD;  Location: [facility]     FRACTURE TX, WRIST RT/LT      LT - hardware removed     HC OPEN TX TIBIAL SHAFT FX W PLATE/SCREWS W/WO CERCLAGE  2008       SOCIAL HX (Reviwed from my previous notes):\  PHYSICAL EXAMINATION:   BP (!) 150/86   Pulse 84   Ht 1.753 m (5' 9\")   Wt 124.3 kg (274 lb)   SpO2 97%   BMI 40.46 kg/m     Exam:  Constitutional: healthy, alert and no distress  Head: Normocephalic. No masses, lesions, tenderness or abnormalities  ENT: ENT exam normal, no neck nodes or sinus tenderness  Cardiovascular: negative, PMI normal. No lifts, heaves, or thrills. RRR. No murmurs, clicks gallops or rub  Respiratory: negative, Percussion normal. Good diaphragmatic excursion. Lungs clear  Gastrointestinal: Abdomen soft, non-tender. BS normal. No masses, organomegaly  : Deferred  Musculoskeletal: extremities normal- no gross deformities noted, gait normal and normal muscle tone  Skin: no suspicious lesions or rashes  Neurologic: Gait normal. Reflexes normal and symmetric. Sensation grossly WNL.  Psychiatric: " mentation appears normal and affect normal/bright    No diagnosis found.  Assessment:     This study did not show any clinically significant sleep disordered breathing.     There was periodic limb movements which were not associated with significant sleep arousals.     Recommendations:    Advice regarding the risks of drowsy driving.    Suggest optimizing sleep schedule and avoiding sleep deprivation.    Weight management (if BMI > 30).    Pharmacologic therapy should be used for management of restless legs syndrome only if present and clinically indicated and not based on the presence of periodic limb movements alone.      Your BMI is There is no height or weight on file to calculate BMI.    Body mass index (BMI) is one way to tell whether you are at a healthy weight, overweight, or obese. It measures your weight in relation to your height.  A BMI of 18.5 to 24.9 is in the healthy range. A person with a BMI of 25 to 29.9 is considered overweight, and someone with a BMI of 30 or greater is considered obese.  Another way to find out if you are at risk for health problems caused by overweight and obesity is to measure your waist. If you are a woman and your waist is more than 35 inches, or if you are a man and your waist is more than 40 inches, your risk of disease may be higher.  More than two-thirds of American adults are considered overweight or obese. Being overweight or obese increases the risk for further weight gain.  Excess weight may lead to heart disease and diabetes. Creating and following plans for healthy eating and physical activity may help you improve your health.    Methods for maintaining or losing weight.    Weight control is part of healthy lifestyle and includes exercise, emotional health, and healthy eating habits.  Careful eating habits lifelong is the mainstay of weight control.  Though there are significant health benefits from weight loss, long-term weight loss with diet alone may be very  difficult to achieve- studies show long-term success with dietary management in less than 10% of people. Attaining a healthy weight may be especially difficult to achieve in those with severe obesity. In some cases, medications, devices and surgical management might be considered.    What can you do?    If you are overweight or obese and are interested in methods for weight loss, you should discuss this with your provider. In addition, we recommend that you review healthy life styles and methods for weight loss available through the National Institutes of Health patient information sites:     http://win.niddk.nih.gov/publications/index.htm      PLAN: After detailed discussion with Chao Pruett, the following plan was agreed upon:   There are no Patient Instructions on file for this visit.  I discussed all of the above with Chao Pruett, who was agreeable with the plan.  I spent 40 minutes with the patient, all of which was spent in discussing the above, and with coordination of care.  Leana Beach MD, MD    RTC as needed

## 2022-10-25 NOTE — NURSING NOTE
"Chief Complaint   Patient presents with     Study Results     PSG f/u       Initial BP (!) 150/86   Pulse 84   Ht 1.753 m (5' 9\")   Wt 124.3 kg (274 lb)   SpO2 97%   BMI 40.46 kg/m   Estimated body mass index is 40.46 kg/m  as calculated from the following:    Height as of this encounter: 1.753 m (5' 9\").    Weight as of this encounter: 124.3 kg (274 lb).    Medication Reconciliation: complete  Olivia Kirkland MA  "

## 2022-12-14 ENCOUNTER — ANCILLARY PROCEDURE (OUTPATIENT)
Dept: GENERAL RADIOLOGY | Facility: CLINIC | Age: 32
End: 2022-12-14
Attending: FAMILY MEDICINE
Payer: COMMERCIAL

## 2022-12-14 ENCOUNTER — OFFICE VISIT (OUTPATIENT)
Dept: URGENT CARE | Facility: URGENT CARE | Age: 32
End: 2022-12-14
Payer: COMMERCIAL

## 2022-12-14 VITALS — DIASTOLIC BLOOD PRESSURE: 78 MMHG | OXYGEN SATURATION: 96 % | SYSTOLIC BLOOD PRESSURE: 137 MMHG | HEART RATE: 60 BPM

## 2022-12-14 DIAGNOSIS — Z71.1 WORRIED WELL: Primary | ICD-10-CM

## 2022-12-14 DIAGNOSIS — T18.9XXA FOREIGN BODY IN DIGESTIVE TRACT, INITIAL ENCOUNTER: ICD-10-CM

## 2022-12-14 PROCEDURE — 74018 RADEX ABDOMEN 1 VIEW: CPT | Mod: TC | Performed by: RADIOLOGY

## 2022-12-14 PROCEDURE — 99213 OFFICE O/P EST LOW 20 MIN: CPT | Performed by: FAMILY MEDICINE

## 2022-12-15 NOTE — PROGRESS NOTES
"  Assessment & Plan     Worried well    Patient reassured abdominal films are negative tonite for any foreign body.    Therese Bartholomew MD  [facility] [place]    Blaine Magallanes is a 32 year old, presenting for the following health issues:  Throat Problem (Thinks swallowed a nail. Holding nails in mouth and coughed hard and one may have went down throat or bounced up and came out but not sure. Unaware how many nails he was holding in mouth. States it tastes \"very metally\". Happened around 4:30pm)      HPI   Patient had 2.5 inch mann nails in mouth today-- coughed and has been worried that he may have swallowed one.  Not sure how many nails he was holding in mouth.  Has metallic taste in mouth.  No blood noted.        Review of Systems   Constitutional, HEENT, cardiovascular, pulmonary, GI, , musculoskeletal, neuro, skin, endocrine and psych systems are negative, except as otherwise noted.      Objective    /78   Pulse 60   SpO2 96%   There is no height or weight on file to calculate BMI.  Physical Exam   GENERAL: healthy, alert and no distress  EYES: Eyes grossly normal to inspection, PERRL and conjunctivae and sclerae normal  ABDOMEN: soft, nontender  MS: no gross musculoskeletal defects noted, no edema    Xray - Reviewed and interpreted by me.  Negative for foreign bodies on abdominal films..                "

## 2022-12-22 ENCOUNTER — OFFICE VISIT (OUTPATIENT)
Dept: URGENT CARE | Facility: URGENT CARE | Age: 32
End: 2022-12-22
Payer: COMMERCIAL

## 2022-12-22 VITALS
DIASTOLIC BLOOD PRESSURE: 79 MMHG | TEMPERATURE: 98.4 F | OXYGEN SATURATION: 98 % | HEART RATE: 62 BPM | SYSTOLIC BLOOD PRESSURE: 133 MMHG

## 2022-12-22 DIAGNOSIS — R07.0 THROAT PAIN: Primary | ICD-10-CM

## 2022-12-22 LAB — DEPRECATED S PYO AG THROAT QL EIA: NEGATIVE

## 2022-12-22 PROCEDURE — 99213 OFFICE O/P EST LOW 20 MIN: CPT | Performed by: PHYSICIAN ASSISTANT

## 2022-12-22 PROCEDURE — 87651 STREP A DNA AMP PROBE: CPT | Performed by: PHYSICIAN ASSISTANT

## 2022-12-22 RX ORDER — OMEPRAZOLE 40 MG/1
CAPSULE, DELAYED RELEASE ORAL
COMMUNITY
Start: 2022-07-20 | End: 2023-01-06

## 2022-12-22 RX ORDER — OMEPRAZOLE 40 MG/1
CAPSULE, DELAYED RELEASE ORAL
Status: CANCELLED | OUTPATIENT
Start: 2022-12-22

## 2022-12-22 ASSESSMENT — ENCOUNTER SYMPTOMS
RHINORRHEA: 1
VOMITING: 0
DIARRHEA: 0
COUGH: 1
FEVER: 0
SORE THROAT: 1

## 2022-12-22 NOTE — PROGRESS NOTES
Assessment & Plan:        ICD-10-CM    1. Throat pain  R07.0 Streptococcus A Rapid Screen w/Reflex to PCR - Clinic Collect     Group A Streptococcus PCR Throat Swab            Plan/Clinical Decision Making:          No follow-ups on file.     At the end of the encounter, I discussed results, diagnosis, medications. Discussed red flags for immediate return to clinic/ER, as well as indications for follow up if no improvement. Patient understood and agreed to plan. Patient was stable for discharge.        Naty De La Cruz PA-C on 12/22/2022 at 4:24 PM          Subjective:     HPI:    Elpidio is a 32 year old male who presents to clinic today for the following health issues:  Chief Complaint   Patient presents with     Pharyngitis     Patient is a 32 yr old male who presents with cough, sore throat, congestion, headache that started on Monday.     HPI    Patient with ST, congestion, headache, mild cough.   Symptoms started on Monday, exposed to sick kids.   Negative covid testing.   No strep exposure.     History obtained from the patient.    Review of Systems   Constitutional: Negative for fever.   HENT: Positive for congestion, rhinorrhea and sore throat.    Respiratory: Positive for cough.    Gastrointestinal: Negative for diarrhea and vomiting.         Patient Active Problem List   Diagnosis     Anxiety     Elevated blood pressure reading without diagnosis of hypertension     BMI 40.0-44.9, adult (H)        Past Medical History:   Diagnosis Date     History of hypertension        Social History     Tobacco Use     Smoking status: Never     Smokeless tobacco: Current     Types: Chew     Tobacco comments:     3-4x/day   Substance Use Topics     Alcohol use: Yes     Comment: occasionally, 1 a month             Objective:     Vitals:    12/22/22 1554   BP: 133/79   BP Location: Right arm   Patient Position: Chair   Cuff Size: Adult Large   Pulse: 62   Temp: 98.4  F (36.9  C)   TempSrc: Oral   SpO2: 98%         Physical  Exam   EXAM:   Pleasant, alert, appropriate appearance. NAD.  Head Exam: Normocephalic, atraumatic.  Eye Exam:   non icteric/injection.    Ear Exam: TMs grey without bulging. Normal canals.  Normal pinna.  Nose Exam: Normal external nose.    OroPharynx Exam:  Moist mucous membranes. mild erythema, pharynx without exudate or hypertrophy. Positive PND.   Neck/Thyroid Exam:  No LAD.    Chest/Respiratory Exam: CTAB.  Cardiovascular Exam: RRR. No murmur or rubs.      Results:  Results for orders placed or performed in visit on 12/22/22   Streptococcus A Rapid Screen w/Reflex to PCR - Clinic Collect     Status: Normal    Specimen: Throat; Swab   Result Value Ref Range    Group A Strep antigen Negative Negative

## 2022-12-23 LAB — GROUP A STREP BY PCR: NOT DETECTED

## 2023-04-20 ENCOUNTER — PATIENT OUTREACH (OUTPATIENT)
Dept: CARE COORDINATION | Facility: CLINIC | Age: 33
End: 2023-04-20
Payer: COMMERCIAL

## 2023-05-28 ENCOUNTER — OFFICE VISIT (OUTPATIENT)
Dept: URGENT CARE | Facility: URGENT CARE | Age: 33
End: 2023-05-28
Payer: COMMERCIAL

## 2023-05-28 VITALS
DIASTOLIC BLOOD PRESSURE: 85 MMHG | RESPIRATION RATE: 16 BRPM | SYSTOLIC BLOOD PRESSURE: 140 MMHG | OXYGEN SATURATION: 98 % | TEMPERATURE: 97.3 F | HEART RATE: 55 BPM

## 2023-05-28 DIAGNOSIS — H65.191 ACUTE MUCOID OTITIS MEDIA OF RIGHT EAR: Primary | ICD-10-CM

## 2023-05-28 DIAGNOSIS — J01.00 ACUTE MAXILLARY SINUSITIS, RECURRENCE NOT SPECIFIED: ICD-10-CM

## 2023-05-28 PROCEDURE — 99213 OFFICE O/P EST LOW 20 MIN: CPT | Performed by: PHYSICIAN ASSISTANT

## 2023-05-28 RX ORDER — FLUTICASONE PROPIONATE 50 MCG
2 SPRAY, SUSPENSION (ML) NASAL DAILY
Qty: 16 G | Refills: 0 | Status: SHIPPED | OUTPATIENT
Start: 2023-05-28 | End: 2023-12-06

## 2023-05-28 RX ORDER — PSEUDOEPHEDRINE HCL 120 MG/1
120 TABLET, FILM COATED, EXTENDED RELEASE ORAL EVERY 12 HOURS
Qty: 20 TABLET | Refills: 0 | Status: SHIPPED | OUTPATIENT
Start: 2023-05-28 | End: 2023-06-07

## 2023-05-28 RX ORDER — AMOXICILLIN 500 MG/1
500 CAPSULE ORAL 3 TIMES DAILY
Qty: 30 CAPSULE | Refills: 0 | Status: SHIPPED | OUTPATIENT
Start: 2023-05-28 | End: 2023-06-07

## 2023-05-28 RX ORDER — CETIRIZINE HYDROCHLORIDE 10 MG/1
10 TABLET ORAL DAILY
Qty: 30 TABLET | Refills: 0 | Status: SHIPPED | OUTPATIENT
Start: 2023-05-28 | End: 2023-06-27

## 2023-05-28 NOTE — PROGRESS NOTES
Assessment & Plan      1. Acute mucoid otitis media of right ear    - amoxicillin (AMOXIL) 500 MG capsule; Take 1 capsule (500 mg) by mouth 3 times daily for 10 days  Dispense: 30 capsule; Refill: 0    2. Acute maxillary sinusitis, recurrence not specified    - pseudoePHEDrine (SUDAFED) 120 MG 12 hr tablet; Take 1 tablet (120 mg) by mouth every 12 hours for 10 days  Dispense: 20 tablet; Refill: 0  - cetirizine (ZYRTEC) 10 MG tablet; Take 1 tablet (10 mg) by mouth daily for 30 days  Dispense: 30 tablet; Refill: 0  - fluticasone (FLONASE) 50 MCG/ACT nasal spray; Spray 2 sprays into both nostrils daily  Dispense: 16 g; Refill: 0    Follow up if not improving over the next week.                 BRENNAN Yadav [facility] [place]    Blaine Magallanes is a 32 year old male who presents to clinic today for the following health issues:  Chief Complaint   Patient presents with     Urgent Care     Sinus Problem     Sinus pressure eyes and nasal area-also right ear pain and yellowish mucous-Started 3 days ago-OTC Tylenol and IBU      HPI    Here for sinus pressure under eyes for 3 days. Right ear pain started yesterday. No fever. Nasal congestion and post nasal drainage. Some fatigue. Slight cough. Some sore throat. Some body aches. He has been using mucinex.           Review of Systems        Objective    BP (!) 140/85   Pulse 55   Temp 97.3  F (36.3  C) (Tympanic)   Resp 16   SpO2 98%   Physical Exam  Vitals and nursing note reviewed.   Constitutional:       General: He is not in acute distress.     Appearance: He is well-developed. He is not diaphoretic.   HENT:      Head: Normocephalic and atraumatic.      Right Ear: Tympanic membrane and external ear normal.      Left Ear: Tympanic membrane and external ear normal.      Nose: Congestion present.      Mouth/Throat:      Mouth: Mucous membranes are moist.      Pharynx: No posterior oropharyngeal erythema.   Eyes:      Pupils: Pupils are  equal, round, and reactive to light.   Cardiovascular:      Rate and Rhythm: Normal rate and regular rhythm.   Pulmonary:      Effort: Pulmonary effort is normal. No respiratory distress.      Breath sounds: Normal breath sounds.   Musculoskeletal:      Cervical back: Normal range of motion and neck supple.   Lymphadenopathy:      Cervical: No cervical adenopathy.   Skin:     General: Skin is warm and dry.   Neurological:      Mental Status: He is alert.      Cranial Nerves: No cranial nerve deficit.

## 2023-06-01 ENCOUNTER — HEALTH MAINTENANCE LETTER (OUTPATIENT)
Age: 33
End: 2023-06-01

## 2023-09-06 DIAGNOSIS — R10.13 DYSPEPSIA: Primary | ICD-10-CM

## 2023-09-07 ENCOUNTER — TELEPHONE (OUTPATIENT)
Dept: FAMILY MEDICINE | Facility: CLINIC | Age: 33
End: 2023-09-07
Payer: COMMERCIAL

## 2023-09-07 NOTE — TELEPHONE ENCOUNTER
Prior Authorization Retail Medication Request    Medication/Dose: omeprazole (PRILOSEC) 20 MG DR capsule  ICD code (if different than what is on RX):    Previously Tried and Failed: None   Rationale: Been taking this medication with good results since 2021.     [employer]    KEY: [id number]  PATIENT LAST NAME: MUNIR  : 1990      Pharmacy Information (if different than what is on RX)  Name:    Phone:        Arnulfo GREEN

## 2023-09-07 NOTE — TELEPHONE ENCOUNTER
PRIOR AUTHORIZATION DENIED    Medication: omeprazole (PRILOSEC) 20 MG DR capsule    Denial Date: 9/7/2023    Denial Rational:  Coverage is provided in situations where the patient is less then or equal to 12 years of age. Review and appeal are not available because of this exclusion.              Appeal Information:  N/A

## 2023-10-05 DIAGNOSIS — F32.A ANXIETY AND DEPRESSION: ICD-10-CM

## 2023-10-05 DIAGNOSIS — F41.9 ANXIETY AND DEPRESSION: ICD-10-CM

## 2023-10-05 RX ORDER — CITALOPRAM HYDROBROMIDE 40 MG/1
40 TABLET ORAL DAILY
Qty: 30 TABLET | Refills: 0 | Status: SHIPPED | OUTPATIENT
Start: 2023-10-05 | End: 2023-11-07

## 2023-10-05 NOTE — LETTER
October 9, 2023      Chao Pruett  [address] [place] [address]        Chao Pruett      We recently received a refill request for your    citalopram (CELEXA) 40 MG tablet   . We have sent in a refill for you to your pharmacy.    Our records show you are due for a follow up visit with your provider.     Please call the clinic at [phone] to schedule as the providers are booking out.        Sincerely,        Pratima Rivas/

## 2023-11-06 DIAGNOSIS — F32.A ANXIETY AND DEPRESSION: ICD-10-CM

## 2023-11-06 DIAGNOSIS — F41.9 ANXIETY AND DEPRESSION: ICD-10-CM

## 2023-11-06 RX ORDER — CITALOPRAM HYDROBROMIDE 40 MG/1
40 TABLET ORAL DAILY
Qty: 30 TABLET | Refills: 0 | OUTPATIENT
Start: 2023-11-06

## 2023-11-06 NOTE — TELEPHONE ENCOUNTER
Left message for patient to return call, please help schedule follow up for any ongoing refills    Pratima Rivas/

## 2023-11-06 NOTE — LETTER
November 8, 2023      Chao Pruett  [address] [place] [address]        Hi Elpidio,    We recently received a refill request from your pharmacy.      Refused Prescriptions     citalopram (CELEXA) 40 MG tablet         Sig: Take 1 tablet (40 mg) by mouth daily          At this time we are not able to provide refills, you are due for follow up.     Please call the clinic at [phone] to schedule as the providers are booking out.     Thank you,    Anaid Naik,

## 2023-11-07 DIAGNOSIS — F41.9 ANXIETY AND DEPRESSION: ICD-10-CM

## 2023-11-07 DIAGNOSIS — F32.A ANXIETY AND DEPRESSION: ICD-10-CM

## 2023-11-07 RX ORDER — CITALOPRAM HYDROBROMIDE 40 MG/1
40 TABLET ORAL DAILY
Qty: 30 TABLET | Refills: 0 | Status: SHIPPED | OUTPATIENT
Start: 2023-11-07 | End: 2023-12-06

## 2023-11-27 NOTE — ED NOTES
A/O. VSS. PIV removed. Pt verbalized understanding of d/c instructions and ambulated to lobby steady gait.   Script rx prilosec given to pt at time of d/c     History/Exam/Medical Decision Making

## 2023-12-06 ENCOUNTER — OFFICE VISIT (OUTPATIENT)
Dept: INTERNAL MEDICINE | Facility: CLINIC | Age: 33
End: 2023-12-06
Payer: COMMERCIAL

## 2023-12-06 VITALS
HEART RATE: 58 BPM | TEMPERATURE: 97.5 F | BODY MASS INDEX: 42.15 KG/M2 | SYSTOLIC BLOOD PRESSURE: 130 MMHG | WEIGHT: 285.4 LBS | DIASTOLIC BLOOD PRESSURE: 76 MMHG | OXYGEN SATURATION: 98 %

## 2023-12-06 DIAGNOSIS — E66.813 CLASS 3 OBESITY: ICD-10-CM

## 2023-12-06 DIAGNOSIS — F41.9 ANXIETY AND DEPRESSION: Primary | ICD-10-CM

## 2023-12-06 DIAGNOSIS — F32.A ANXIETY AND DEPRESSION: Primary | ICD-10-CM

## 2023-12-06 PROCEDURE — 99213 OFFICE O/P EST LOW 20 MIN: CPT | Performed by: INTERNAL MEDICINE

## 2023-12-06 RX ORDER — CITALOPRAM HYDROBROMIDE 40 MG/1
40 TABLET ORAL DAILY
Qty: 90 TABLET | Refills: 0 | Status: SHIPPED | OUTPATIENT
Start: 2023-12-06 | End: 2024-01-24

## 2023-12-06 ASSESSMENT — PATIENT HEALTH QUESTIONNAIRE - PHQ9
10. IF YOU CHECKED OFF ANY PROBLEMS, HOW DIFFICULT HAVE THESE PROBLEMS MADE IT FOR YOU TO DO YOUR WORK, TAKE CARE OF THINGS AT HOME, OR GET ALONG WITH OTHER PEOPLE: SOMEWHAT DIFFICULT
SUM OF ALL RESPONSES TO PHQ QUESTIONS 1-9: 10
SUM OF ALL RESPONSES TO PHQ QUESTIONS 1-9: 10

## 2023-12-06 NOTE — COMMUNITY RESOURCES LIST (ENGLISH)
12/06/2023   [facility]  N/A  For questions about this resource list or additional care needs, please contact your primary care clinic or care manager.  Phone: [phone]   Email: N/A   Address: [address] [place] [address]   Hours: N/A        Financial Stability       Utility payment assistance  1  [address] [place] [address] Distance: 7.47 miles      In-Person, Phone/Virtual   [address] [place] [address]  Language: English  Hours: Mon 8:00 AM - 4:00 PM , Tue 8:00 AM - 7:00 PM , Wed - Thu 8:00 AM - 4:00 PM  Fees: Free   Phone: (641) 114-9281 Email: info@[employer] Website: https://[employer]/resources/resource-centers/     2  [employer] ([facility]) [facility] - Energy Assistance Program (EAP) Distance: 7.63 miles      In-Person   [address] [place] [address]  Language: English, Canadian  Hours: Mon - Fri 8:00 AM - 4:30 PM  Fees: Free   Phone: (552) 843-6581 Email: info@[employer] Website: http://www.[employer].org          Important Numbers & Websites       Emergency Services   911  City Services   311  Poison Control   (459) 390-7797  Suicide Prevention Lifeline   (494) 107-2249 (TALK)  Child Abuse Hotline   (337) 342-1094 (4-A-Child)  Sexual Assault Hotline   (173) 670-7584 (HOPE)  National Runaway Safeline   (954) 855-1633 (RUNAWAY)  All-Options Talkline   (593) 680-7802  Substance Abuse Referral   (158) 601-1173 (HELP)

## 2023-12-06 NOTE — PROGRESS NOTES
Assessment & Plan   Anxiety and depression  Refilled chronic medication at current dose. Defer further refills to his PCP who he is seeing next month.  - citalopram (CELEXA) 40 MG tablet; Take 1 tablet (40 mg) by mouth daily    Class 3 obesity (H)  BMI 42.    F/u with regular PCP at regular interval or sooner JASON Mallory MD  [facility]    Blaine Magallanes is a 33 year old who presents for a next day acute care visit with chief concern of: med check. This is the first time I have met Elpidio, who typically gets care at clinics closer to his residence. Has been off citalopram for ~1 month due to inability to get refills. Would like to go back on it. Tolerated it well. Felt it helped. No other concerns.    Review of Systems   Psych system negative, except as otherwise noted.      Objective    /76   Pulse 58   Temp 97.5  F (36.4  C) (Temporal)   Wt 129.5 kg (285 lb 6.4 oz)   SpO2 98%   BMI 42.15 kg/m    Body mass index is 42.15 kg/m .    Physical Exam   GENERAL: alert and in no distress.

## 2023-12-08 ASSESSMENT — ANXIETY QUESTIONNAIRES
3. WORRYING TOO MUCH ABOUT DIFFERENT THINGS: MORE THAN HALF THE DAYS
7. FEELING AFRAID AS IF SOMETHING AWFUL MIGHT HAPPEN: NOT AT ALL
6. BECOMING EASILY ANNOYED OR IRRITABLE: MORE THAN HALF THE DAYS
IF YOU CHECKED OFF ANY PROBLEMS ON THIS QUESTIONNAIRE, HOW DIFFICULT HAVE THESE PROBLEMS MADE IT FOR YOU TO DO YOUR WORK, TAKE CARE OF THINGS AT HOME, OR GET ALONG WITH OTHER PEOPLE: SOMEWHAT DIFFICULT
2. NOT BEING ABLE TO STOP OR CONTROL WORRYING: SEVERAL DAYS
5. BEING SO RESTLESS THAT IT IS HARD TO SIT STILL: MORE THAN HALF THE DAYS
1. FEELING NERVOUS, ANXIOUS, OR ON EDGE: SEVERAL DAYS
GAD7 TOTAL SCORE: 10
GAD7 TOTAL SCORE: 10

## 2023-12-08 ASSESSMENT — PATIENT HEALTH QUESTIONNAIRE - PHQ9: 5. POOR APPETITE OR OVEREATING: MORE THAN HALF THE DAYS

## 2024-01-24 ENCOUNTER — OFFICE VISIT (OUTPATIENT)
Dept: FAMILY MEDICINE | Facility: CLINIC | Age: 34
End: 2024-01-24
Payer: COMMERCIAL

## 2024-01-24 VITALS
WEIGHT: 280 LBS | HEART RATE: 88 BPM | SYSTOLIC BLOOD PRESSURE: 132 MMHG | BODY MASS INDEX: 41.35 KG/M2 | OXYGEN SATURATION: 98 % | TEMPERATURE: 98.1 F | DIASTOLIC BLOOD PRESSURE: 82 MMHG | RESPIRATION RATE: 20 BRPM

## 2024-01-24 DIAGNOSIS — F41.1 GENERALIZED ANXIETY DISORDER: ICD-10-CM

## 2024-01-24 DIAGNOSIS — Z00.00 ROUTINE GENERAL MEDICAL EXAMINATION AT A HEALTH CARE FACILITY: Primary | ICD-10-CM

## 2024-01-24 DIAGNOSIS — E66.01 CLASS 3 SEVERE OBESITY DUE TO EXCESS CALORIES WITHOUT SERIOUS COMORBIDITY WITH BODY MASS INDEX (BMI) OF 40.0 TO 44.9 IN ADULT (H): ICD-10-CM

## 2024-01-24 DIAGNOSIS — R10.13 DYSPEPSIA: ICD-10-CM

## 2024-01-24 DIAGNOSIS — F33.1 MODERATE RECURRENT MAJOR DEPRESSION (H): ICD-10-CM

## 2024-01-24 DIAGNOSIS — E66.813 CLASS 3 SEVERE OBESITY DUE TO EXCESS CALORIES WITHOUT SERIOUS COMORBIDITY WITH BODY MASS INDEX (BMI) OF 40.0 TO 44.9 IN ADULT (H): ICD-10-CM

## 2024-01-24 LAB
ALBUMIN SERPL BCG-MCNC: 4.5 G/DL (ref 3.5–5.2)
ALP SERPL-CCNC: 85 U/L (ref 40–150)
ALT SERPL W P-5'-P-CCNC: 95 U/L (ref 0–70)
ANION GAP SERPL CALCULATED.3IONS-SCNC: 11 MMOL/L (ref 7–15)
AST SERPL W P-5'-P-CCNC: 41 U/L (ref 0–45)
BILIRUB SERPL-MCNC: 0.4 MG/DL
BUN SERPL-MCNC: 18.4 MG/DL (ref 6–20)
CALCIUM SERPL-MCNC: 9.9 MG/DL (ref 8.6–10)
CHLORIDE SERPL-SCNC: 104 MMOL/L (ref 98–107)
CHOLEST SERPL-MCNC: 172 MG/DL
CREAT SERPL-MCNC: 1.11 MG/DL (ref 0.67–1.17)
DEPRECATED HCO3 PLAS-SCNC: 25 MMOL/L (ref 22–29)
EGFRCR SERPLBLD CKD-EPI 2021: 90 ML/MIN/1.73M2
ERYTHROCYTE [DISTWIDTH] IN BLOOD BY AUTOMATED COUNT: 12.3 % (ref 10–15)
FASTING STATUS PATIENT QL REPORTED: YES
GLUCOSE SERPL-MCNC: 101 MG/DL (ref 70–99)
HBA1C MFR BLD: 5.1 % (ref 0–5.6)
HCT VFR BLD AUTO: 46.4 % (ref 40–53)
HDLC SERPL-MCNC: 36 MG/DL
HGB BLD-MCNC: 16 G/DL (ref 13.3–17.7)
LDLC SERPL CALC-MCNC: 64 MG/DL
MCH RBC QN AUTO: 27.6 PG (ref 26.5–33)
MCHC RBC AUTO-ENTMCNC: 34.5 G/DL (ref 31.5–36.5)
MCV RBC AUTO: 80 FL (ref 78–100)
NONHDLC SERPL-MCNC: 136 MG/DL
PLATELET # BLD AUTO: 195 10E3/UL (ref 150–450)
POTASSIUM SERPL-SCNC: 5.2 MMOL/L (ref 3.4–5.3)
PROT SERPL-MCNC: 7.2 G/DL (ref 6.4–8.3)
RBC # BLD AUTO: 5.8 10E6/UL (ref 4.4–5.9)
SODIUM SERPL-SCNC: 140 MMOL/L (ref 135–145)
TRIGL SERPL-MCNC: 358 MG/DL
WBC # BLD AUTO: 8.7 10E3/UL (ref 4–11)

## 2024-01-24 PROCEDURE — 80061 LIPID PANEL: CPT | Performed by: FAMILY MEDICINE

## 2024-01-24 PROCEDURE — 36415 COLL VENOUS BLD VENIPUNCTURE: CPT | Performed by: FAMILY MEDICINE

## 2024-01-24 PROCEDURE — 80053 COMPREHEN METABOLIC PANEL: CPT | Performed by: FAMILY MEDICINE

## 2024-01-24 PROCEDURE — 83036 HEMOGLOBIN GLYCOSYLATED A1C: CPT | Performed by: FAMILY MEDICINE

## 2024-01-24 PROCEDURE — 85027 COMPLETE CBC AUTOMATED: CPT | Performed by: FAMILY MEDICINE

## 2024-01-24 PROCEDURE — 99214 OFFICE O/P EST MOD 30 MIN: CPT | Mod: 25 | Performed by: FAMILY MEDICINE

## 2024-01-24 PROCEDURE — 99395 PREV VISIT EST AGE 18-39: CPT | Performed by: FAMILY MEDICINE

## 2024-01-24 RX ORDER — CITALOPRAM HYDROBROMIDE 40 MG/1
40 TABLET ORAL DAILY
Qty: 90 TABLET | Refills: 4 | Status: SHIPPED | OUTPATIENT
Start: 2024-01-24 | End: 2024-09-26

## 2024-01-24 SDOH — HEALTH STABILITY: PHYSICAL HEALTH: ON AVERAGE, HOW MANY DAYS PER WEEK DO YOU ENGAGE IN MODERATE TO STRENUOUS EXERCISE (LIKE A BRISK WALK)?: 3 DAYS

## 2024-01-24 ASSESSMENT — ENCOUNTER SYMPTOMS
FEVER: 0
FREQUENCY: 0
SORE THROAT: 0
NAUSEA: 0
ABDOMINAL PAIN: 0
DYSURIA: 0
ARTHRALGIAS: 0
MYALGIAS: 0
PARESTHESIAS: 0
CHILLS: 0
HEMATOCHEZIA: 0
HEARTBURN: 0
NERVOUS/ANXIOUS: 0
WEAKNESS: 0
HEADACHES: 0
COUGH: 0
HEMATURIA: 0
EYE PAIN: 0
SHORTNESS OF BREATH: 0
DIZZINESS: 0
DIARRHEA: 0
JOINT SWELLING: 0
PALPITATIONS: 0
CONSTIPATION: 0

## 2024-01-24 ASSESSMENT — LIFESTYLE VARIABLES
AUDIT-C TOTAL SCORE: 1
HOW OFTEN DO YOU HAVE SIX OR MORE DRINKS ON ONE OCCASION: NEVER
SKIP TO QUESTIONS 9-10: 1
HOW OFTEN DO YOU HAVE A DRINK CONTAINING ALCOHOL: MONTHLY OR LESS
HOW MANY STANDARD DRINKS CONTAINING ALCOHOL DO YOU HAVE ON A TYPICAL DAY: 1 OR 2

## 2024-01-24 ASSESSMENT — ANXIETY QUESTIONNAIRES
GAD7 TOTAL SCORE: 13
1. FEELING NERVOUS, ANXIOUS, OR ON EDGE: SEVERAL DAYS
2. NOT BEING ABLE TO STOP OR CONTROL WORRYING: MORE THAN HALF THE DAYS
7. FEELING AFRAID AS IF SOMETHING AWFUL MIGHT HAPPEN: MORE THAN HALF THE DAYS
IF YOU CHECKED OFF ANY PROBLEMS ON THIS QUESTIONNAIRE, HOW DIFFICULT HAVE THESE PROBLEMS MADE IT FOR YOU TO DO YOUR WORK, TAKE CARE OF THINGS AT HOME, OR GET ALONG WITH OTHER PEOPLE: SOMEWHAT DIFFICULT
GAD7 TOTAL SCORE: 13
5. BEING SO RESTLESS THAT IT IS HARD TO SIT STILL: MORE THAN HALF THE DAYS
GAD7 TOTAL SCORE: 13
8. IF YOU CHECKED OFF ANY PROBLEMS, HOW DIFFICULT HAVE THESE MADE IT FOR YOU TO DO YOUR WORK, TAKE CARE OF THINGS AT HOME, OR GET ALONG WITH OTHER PEOPLE?: SOMEWHAT DIFFICULT
7. FEELING AFRAID AS IF SOMETHING AWFUL MIGHT HAPPEN: MORE THAN HALF THE DAYS
4. TROUBLE RELAXING: MORE THAN HALF THE DAYS
6. BECOMING EASILY ANNOYED OR IRRITABLE: MORE THAN HALF THE DAYS
3. WORRYING TOO MUCH ABOUT DIFFERENT THINGS: MORE THAN HALF THE DAYS

## 2024-01-24 ASSESSMENT — SOCIAL DETERMINANTS OF HEALTH (SDOH)
DO YOU BELONG TO ANY CLUBS OR ORGANIZATIONS SUCH AS CHURCH GROUPS UNIONS, FRATERNAL OR ATHLETIC GROUPS, OR SCHOOL GROUPS?: YES
HOW OFTEN DO YOU GET TOGETHER WITH FRIENDS OR RELATIVES?: ONCE A WEEK
HOW OFTEN DO YOU ATTEND CHURCH OR RELIGIOUS SERVICES?: MORE THAN 4 TIMES PER YEAR
IN A TYPICAL WEEK, HOW MANY TIMES DO YOU TALK ON THE PHONE WITH FAMILY, FRIENDS, OR NEIGHBORS?: MORE THAN THREE TIMES A WEEK
HOW OFTEN DO YOU ATTENT MEETINGS OF THE CLUB OR ORGANIZATION YOU BELONG TO?: MORE THAN 4 TIMES PER YEAR

## 2024-01-24 ASSESSMENT — PATIENT HEALTH QUESTIONNAIRE - PHQ9
SUM OF ALL RESPONSES TO PHQ QUESTIONS 1-9: 8
10. IF YOU CHECKED OFF ANY PROBLEMS, HOW DIFFICULT HAVE THESE PROBLEMS MADE IT FOR YOU TO DO YOUR WORK, TAKE CARE OF THINGS AT HOME, OR GET ALONG WITH OTHER PEOPLE: SOMEWHAT DIFFICULT
SUM OF ALL RESPONSES TO PHQ QUESTIONS 1-9: 8

## 2024-01-24 NOTE — PROGRESS NOTES
"Preventive Care Visit  [facility]  Frandy Sanchez MD, Family Medicine  Jan 24, 2024      SUBJECTIVE:   Elpidio is a 33 year old, presenting for the following:  Physical        1/24/2024     6:59 AM   Additional Questions   Roomed by Shannan POOL     Healthy Habits:     Getting at least 3 servings of Calcium per day:  Yes    Bi-annual eye exam:  Yes    Dental care twice a year:  Yes    Sleep apnea or symptoms of sleep apnea:  Daytime drowsiness and Excessive snoring    Diet:  Regular (no restrictions)    Frequency of exercise:  1 day/week    Duration of exercise:  15-30 minutes    Taking medications regularly:  Yes    Medication side effects:  None    Additional concerns today:  No  Answers submitted by the patient for this visit:  Patient Health Questionnaire (Submitted on 1/24/2024)  If you checked off any problems, how difficult have these problems made it for you to do your work, take care of things at home, or get along with other people?: Somewhat difficult  PHQ9 TOTAL SCORE: 8  GREYSON-7 (Submitted on 1/24/2024)  GREYSON 7 TOTAL SCORE: 13  Annual Preventive Visit (Submitted on 1/24/2024)  Chief Complaint: Annual Exam:  Blood in stool: No  heartburn: No  peripheral edema: No  mood changes: No  Skin sensation changes: No  impotence: No    Today's PHQ-9 Score:       1/24/2024     6:53 AM   PHQ-9 SCORE   PHQ-9 Total Score MyChart 8 (Mild depression)   PHQ-9 Total Score 8             Social History     Tobacco Use    Smoking status: Never    Smokeless tobacco: Former     Types: Chew    Tobacco comments:     3-4x/day   Substance Use Topics    Alcohol use: Yes     Comment: occasionally, 1 a month           1/24/2024     6:55 AM   Alcohol Use   Prescreen: >3 drinks/day or >7 drinks/week? No       Last PSA: No results found for: \"PSA\"    Reviewed orders with patient. Reviewed health maintenance and updated orders accordingly - Yes  Lab work is in process  Labs reviewed in EPIC    Reviewed and updated as needed " "this visit by clinical staff   Tobacco  Allergies  Meds              Reviewed and updated as needed this visit by Provider                    Review of Systems   Constitutional:  Negative for chills and fever.   HENT:  Negative for congestion, ear pain, hearing loss and sore throat.    Eyes:  Negative for pain and visual disturbance.   Respiratory:  Negative for cough and shortness of breath.    Cardiovascular:  Negative for chest pain and palpitations.   Gastrointestinal:  Negative for abdominal pain, constipation, diarrhea and nausea.   Genitourinary:  Negative for dysuria, frequency, genital sores, hematuria, penile discharge and urgency.   Musculoskeletal:  Negative for arthralgias, joint swelling and myalgias.   Skin:  Negative for rash.   Neurological:  Negative for dizziness, weakness and headaches.   Psychiatric/Behavioral:  The patient is not nervous/anxious.          OBJECTIVE:   /82 (Cuff Size: Adult Large)   Pulse 88   Temp 98.1  F (36.7  C) (Oral)   Resp 20   Wt 127 kg (280 lb)   SpO2 98%   BMI 41.35 kg/m     Estimated body mass index is 41.35 kg/m  as calculated from the following:    Height as of 10/25/22: 1.753 m (5' 9\").    Weight as of this encounter: 127 kg (280 lb).  Physical Exam  Vitals reviewed.   Constitutional:       General: He is not in acute distress.     Appearance: Normal appearance. He is not ill-appearing.   HENT:      Head: Normocephalic and atraumatic.      Right Ear: External ear normal.      Left Ear: External ear normal.      Nose: Nose normal.      Mouth/Throat:      Mouth: Mucous membranes are moist.      Pharynx: Oropharynx is clear.   Eyes:      General: No scleral icterus.        Right eye: No discharge.         Left eye: No discharge.      Extraocular Movements: Extraocular movements intact.      Pupils: Pupils are equal, round, and reactive to light.   Neck:      Vascular: No JVD.   Cardiovascular:      Rate and Rhythm: Normal rate and regular rhythm. "   Pulmonary:      Effort: Pulmonary effort is normal. No respiratory distress.      Breath sounds: Normal breath sounds.   Abdominal:      General: Abdomen is flat. Bowel sounds are normal.      Palpations: Abdomen is soft.   Musculoskeletal:         General: No swelling.      Cervical back: Normal range of motion.   Lymphadenopathy:      Cervical: No cervical adenopathy.   Skin:     General: Skin is warm.      Capillary Refill: Capillary refill takes less than 2 seconds.   Neurological:      General: No focal deficit present.      Mental Status: He is alert.   Psychiatric:         Mood and Affect: Mood normal.         Behavior: Behavior normal.               ASSESSMENT/PLAN:   Routine general medical examination at a health care facility  - Hemoglobin A1c  - Lipid panel reflex to direct LDL Fasting  - CBC with platelets  - Comprehensive metabolic panel (BMP + Alb, Alk Phos, ALT, AST, Total. Bili, TP)  - Hemoglobin A1c  - Lipid panel reflex to direct LDL Fasting  - CBC with platelets  - Comprehensive metabolic panel (BMP + Alb, Alk Phos, ALT, AST, Total. Bili, TP)    Generalized anxiety disorder  Continue current medical management  - citalopram (CELEXA) 40 MG tablet; Take 1 tablet (40 mg) by mouth daily    Moderate recurrent major depression (H)  Overall controlled, continue Celexa as per above    Dyspepsia  - omeprazole (PRILOSEC) 20 MG DR capsule  Dispense: 90 capsule; Refill: 4    Class 3 severe obesity due to excess calories without serious comorbidity with body mass index (BMI) of 40.0 to 44.9 in adult (H)            Counseling  Reviewed preventive health counseling, as reflected in patient instructions       Regular exercise       Healthy diet/nutrition        He reports that he has never smoked. He has quit using smokeless tobacco.  His smokeless tobacco use included chew.          Signed Electronically by: Frandy Sanchez MD

## 2024-09-26 ENCOUNTER — MYC REFILL (OUTPATIENT)
Dept: FAMILY MEDICINE | Facility: CLINIC | Age: 34
End: 2024-09-26
Payer: COMMERCIAL

## 2024-09-26 DIAGNOSIS — R10.13 DYSPEPSIA: ICD-10-CM

## 2024-09-26 DIAGNOSIS — F41.1 GENERALIZED ANXIETY DISORDER: ICD-10-CM

## 2024-09-26 RX ORDER — CITALOPRAM HYDROBROMIDE 40 MG/1
40 TABLET ORAL DAILY
Qty: 90 TABLET | Refills: 1 | Status: SHIPPED | OUTPATIENT
Start: 2024-09-26

## 2025-01-20 ENCOUNTER — OFFICE VISIT (OUTPATIENT)
Dept: FAMILY MEDICINE | Facility: CLINIC | Age: 35
End: 2025-01-20
Payer: COMMERCIAL

## 2025-01-20 VITALS
DIASTOLIC BLOOD PRESSURE: 82 MMHG | RESPIRATION RATE: 18 BRPM | BODY MASS INDEX: 42.28 KG/M2 | HEART RATE: 61 BPM | TEMPERATURE: 98.2 F | HEIGHT: 68 IN | WEIGHT: 279 LBS | SYSTOLIC BLOOD PRESSURE: 132 MMHG | OXYGEN SATURATION: 97 %

## 2025-01-20 DIAGNOSIS — E66.01 CLASS 3 SEVERE OBESITY DUE TO EXCESS CALORIES WITHOUT SERIOUS COMORBIDITY WITH BODY MASS INDEX (BMI) OF 40.0 TO 44.9 IN ADULT (H): ICD-10-CM

## 2025-01-20 DIAGNOSIS — Z30.09 ENCOUNTER FOR OTHER GENERAL COUNSELING OR ADVICE ON CONTRACEPTION: ICD-10-CM

## 2025-01-20 DIAGNOSIS — Z00.00 ANNUAL PHYSICAL EXAM: Primary | ICD-10-CM

## 2025-01-20 DIAGNOSIS — R06.83 SNORING: ICD-10-CM

## 2025-01-20 DIAGNOSIS — E66.813 CLASS 3 SEVERE OBESITY DUE TO EXCESS CALORIES WITHOUT SERIOUS COMORBIDITY WITH BODY MASS INDEX (BMI) OF 40.0 TO 44.9 IN ADULT (H): ICD-10-CM

## 2025-01-20 PROCEDURE — 99395 PREV VISIT EST AGE 18-39: CPT | Performed by: FAMILY MEDICINE

## 2025-01-20 PROCEDURE — 99213 OFFICE O/P EST LOW 20 MIN: CPT | Mod: 25 | Performed by: FAMILY MEDICINE

## 2025-01-20 PROCEDURE — 80061 LIPID PANEL: CPT | Performed by: FAMILY MEDICINE

## 2025-01-20 PROCEDURE — 80053 COMPREHEN METABOLIC PANEL: CPT | Performed by: FAMILY MEDICINE

## 2025-01-20 PROCEDURE — 36415 COLL VENOUS BLD VENIPUNCTURE: CPT | Performed by: FAMILY MEDICINE

## 2025-01-20 SDOH — HEALTH STABILITY: PHYSICAL HEALTH: ON AVERAGE, HOW MANY DAYS PER WEEK DO YOU ENGAGE IN MODERATE TO STRENUOUS EXERCISE (LIKE A BRISK WALK)?: 3 DAYS

## 2025-01-20 SDOH — HEALTH STABILITY: PHYSICAL HEALTH: ON AVERAGE, HOW MANY MINUTES DO YOU ENGAGE IN EXERCISE AT THIS LEVEL?: 20 MIN

## 2025-01-20 ASSESSMENT — PATIENT HEALTH QUESTIONNAIRE - PHQ9
10. IF YOU CHECKED OFF ANY PROBLEMS, HOW DIFFICULT HAVE THESE PROBLEMS MADE IT FOR YOU TO DO YOUR WORK, TAKE CARE OF THINGS AT HOME, OR GET ALONG WITH OTHER PEOPLE: NOT DIFFICULT AT ALL
SUM OF ALL RESPONSES TO PHQ QUESTIONS 1-9: 5
SUM OF ALL RESPONSES TO PHQ QUESTIONS 1-9: 5

## 2025-01-20 ASSESSMENT — ANXIETY QUESTIONNAIRES
2. NOT BEING ABLE TO STOP OR CONTROL WORRYING: SEVERAL DAYS
IF YOU CHECKED OFF ANY PROBLEMS ON THIS QUESTIONNAIRE, HOW DIFFICULT HAVE THESE PROBLEMS MADE IT FOR YOU TO DO YOUR WORK, TAKE CARE OF THINGS AT HOME, OR GET ALONG WITH OTHER PEOPLE: SOMEWHAT DIFFICULT
GAD7 TOTAL SCORE: 10
8. IF YOU CHECKED OFF ANY PROBLEMS, HOW DIFFICULT HAVE THESE MADE IT FOR YOU TO DO YOUR WORK, TAKE CARE OF THINGS AT HOME, OR GET ALONG WITH OTHER PEOPLE?: SOMEWHAT DIFFICULT
1. FEELING NERVOUS, ANXIOUS, OR ON EDGE: SEVERAL DAYS
6. BECOMING EASILY ANNOYED OR IRRITABLE: MORE THAN HALF THE DAYS
7. FEELING AFRAID AS IF SOMETHING AWFUL MIGHT HAPPEN: SEVERAL DAYS
GAD7 TOTAL SCORE: 10
3. WORRYING TOO MUCH ABOUT DIFFERENT THINGS: SEVERAL DAYS
7. FEELING AFRAID AS IF SOMETHING AWFUL MIGHT HAPPEN: SEVERAL DAYS
4. TROUBLE RELAXING: MORE THAN HALF THE DAYS
GAD7 TOTAL SCORE: 10
5. BEING SO RESTLESS THAT IT IS HARD TO SIT STILL: MORE THAN HALF THE DAYS

## 2025-01-20 ASSESSMENT — SOCIAL DETERMINANTS OF HEALTH (SDOH): HOW OFTEN DO YOU GET TOGETHER WITH FRIENDS OR RELATIVES?: ONCE A WEEK

## 2025-01-20 ASSESSMENT — PAIN SCALES - GENERAL: PAINLEVEL_OUTOF10: NO PAIN (0)

## 2025-01-20 NOTE — PROGRESS NOTES
"Preventive Care Visit  [facility]  Frandy Sanchez MD, Family Medicine  Jan 20, 2025      Assessment & Plan     Annual physical exam  - REVIEW OF HEALTH MAINTENANCE PROTOCOL ORDERS  - Lipid panel reflex to direct LDL Fasting  - Comprehensive metabolic panel (BMP + Alb, Alk Phos, ALT, AST, Total. Bili, TP)  - Lipid panel reflex to direct LDL Fasting  - Comprehensive metabolic panel (BMP + Alb, Alk Phos, ALT, AST, Total. Bili, TP)    Snoring  Negative sleep test x 2, has primary snoring.  Recommend consultation with sleep medicine as this may have been quite disruptive for his relationship.  - Adult Sleep Eval & Management  Referral    Class 3 severe obesity due to excess calories without serious comorbidity with body mass index (BMI) of 40.0 to 44.9 in adult (H)    Encounter for other general counseling or advice on contraception   3rd child on the way, he will go for vasectomy.  Referral placed to urology.  - Adult Urology  Referral          BMI  Estimated body mass index is 42.42 kg/m  as calculated from the following:    Height as of this encounter: 1.727 m (5' 8\").    Weight as of this encounter: 126.6 kg (279 lb).   Weight management plan: Discussed healthy diet and exercise guidelines    Counseling  Appropriate preventive services were addressed with this patient via screening, questionnaire, or discussion as appropriate for fall prevention, nutrition, physical activity, Tobacco-use cessation, social engagement, weight loss and cognition.  Checklist reviewing preventive services available has been given to the patient.  Reviewed patient's diet, addressing concerns and/or questions.   He is at risk for lack of exercise and has been provided with information to increase physical activity for the benefit of his well-being.   He is at risk for psychosocial distress and has been provided with information to reduce risk.   The patient's PHQ-9 score is consistent with mild " depression. He was provided with information regarding depression.           Subjective   Elpidio is a 34 year old, presenting for the following:  Physical (Here today for his Routine Medical Exam. Non-Fasting. )        1/20/2025     5:06 PM   Additional Questions   Roomed by Veena Alves CMA   Accompanied by Self          HPI    Medication Follow   Taking Medication as prescribed: yes  Side Effects:  None  Medication Helping Symptoms:  yes    Patient also has concerns for loud snoring which has been disruptive for his wife.  He has been using an old sleep apnea device prescribed to one of his family members and wonders how he can get a new machine.  He has had 2 sleep test which both have been negative for obstructive sleep apnea.    He also would like to have a vasectomy.  Wife is currently 11 weeks pregnant, third child.    Health Care Directive  Patient does not have a Health Care Directive: Discussed advance care planning with patient; however, patient declined at this time.      1/20/2025   General Health   How would you rate your overall physical health? (!) FAIR   Feel stress (tense, anxious, or unable to sleep) To some extent   (!) STRESS CONCERN      1/20/2025   Nutrition   Three or more servings of calcium each day? Yes   Diet: Regular (no restrictions)   How many servings of fruit and vegetables per day? (!) 0-1   How many sweetened beverages each day? (!) 2         1/20/2025   Exercise   Days per week of moderate/strenous exercise 3 days   Average minutes spent exercising at this level 20 min         1/20/2025   Social Factors   Frequency of gathering with friends or relatives Once a week   Worry food won't last until get money to buy more No   Food not last or not have enough money for food? No   Do you have housing? (Housing is defined as stable permanent housing and does not include staying ouside in a car, in a tent, in an abandoned building, in an overnight shelter, or couch-surfing.) Yes   Are you  "worried about losing your housing? No   Lack of transportation? No   Unable to get utilities (heat,electricity)? No         1/20/2025   Dental   Dentist two times every year? Yes         1/20/2025   TB Screening   Were you born outside of the [place]? No       Today's PHQ-9 Score:       1/20/2025     5:01 PM   PHQ-9 SCORE   PHQ-9 Total Score MyChart 5 (Mild depression)   PHQ-9 Total Score 5        Patient-reported         1/20/2025   Substance Use   Alcohol more than 3/day or more than 7/wk No   Do you use any other substances recreationally? (!) CANNABIS PRODUCTS     Social History     Tobacco Use    Smoking status: Never    Smokeless tobacco: Former     Types: Chew    Tobacco comments:     3-4x/day   Vaping Use    Vaping status: Never Used   Substance Use Topics    Alcohol use: Yes     Comment: occasionally, 1 a month    Drug use: Yes     Types: Marijuana     Comment: occasionally           1/20/2025   STI Screening   New sexual partner(s) since last STI/HIV test? No         1/20/2025   Contraception/Family Planning   Questions about contraception or family planning (!) YES         Reviewed and updated as needed this visit by Provider     Meds                         Objective    Exam  /82   Pulse 61   Temp 98.2  F (36.8  C) (Oral)   Resp 18   Ht 1.727 m (5' 8\")   Wt 126.6 kg (279 lb)   SpO2 97%   BMI 42.42 kg/m     Estimated body mass index is 42.42 kg/m  as calculated from the following:    Height as of this encounter: 1.727 m (5' 8\").    Weight as of this encounter: 126.6 kg (279 lb).    Physical Exam  Vitals reviewed.   Constitutional:       General: He is not in acute distress.     Appearance: Normal appearance. He is not ill-appearing.   HENT:      Head: Normocephalic and atraumatic.      Right Ear: External ear normal.      Left Ear: External ear normal.      Nose: Nose normal.      Mouth/Throat:      Mouth: Mucous membranes are moist.      Pharynx: Oropharynx is clear.   Eyes:      General: No " scleral icterus.        Right eye: No discharge.         Left eye: No discharge.      Extraocular Movements: Extraocular movements intact.      Pupils: Pupils are equal, round, and reactive to light.   Neck:      Vascular: No JVD.   Cardiovascular:      Rate and Rhythm: Normal rate and regular rhythm.   Pulmonary:      Effort: Pulmonary effort is normal. No respiratory distress.      Breath sounds: Normal breath sounds.   Abdominal:      General: Abdomen is flat. Bowel sounds are normal.      Palpations: Abdomen is soft.   Musculoskeletal:         General: No swelling.      Cervical back: Normal range of motion.   Lymphadenopathy:      Cervical: No cervical adenopathy.   Skin:     General: Skin is warm.      Capillary Refill: Capillary refill takes less than 2 seconds.   Neurological:      General: No focal deficit present.      Mental Status: He is alert.   Psychiatric:         Mood and Affect: Mood normal.         Behavior: Behavior normal.               Signed Electronically by: Frandy Sanchez MD    Answers submitted by the patient for this visit:  Patient Health Questionnaire (Submitted on 1/20/2025)  If you checked off any problems, how difficult have these problems made it for you to do your work, take care of things at home, or get along with other people?: Not difficult at all  PHQ9 TOTAL SCORE: 5  Patient Health Questionnaire (G7) (Submitted on 1/20/2025)  GREYSON 7 TOTAL SCORE: 10

## 2025-01-21 LAB
ALBUMIN SERPL BCG-MCNC: 4.8 G/DL (ref 3.5–5.2)
ALP SERPL-CCNC: 78 U/L (ref 40–150)
ALT SERPL W P-5'-P-CCNC: 59 U/L (ref 0–70)
ANION GAP SERPL CALCULATED.3IONS-SCNC: 12 MMOL/L (ref 7–15)
AST SERPL W P-5'-P-CCNC: 33 U/L (ref 0–45)
BILIRUB SERPL-MCNC: 0.4 MG/DL
BUN SERPL-MCNC: 10.2 MG/DL (ref 6–20)
CALCIUM SERPL-MCNC: 9.7 MG/DL (ref 8.8–10.4)
CHLORIDE SERPL-SCNC: 101 MMOL/L (ref 98–107)
CHOLEST SERPL-MCNC: 184 MG/DL
CREAT SERPL-MCNC: 1.11 MG/DL (ref 0.67–1.17)
EGFRCR SERPLBLD CKD-EPI 2021: 90 ML/MIN/1.73M2
FASTING STATUS PATIENT QL REPORTED: YES
FASTING STATUS PATIENT QL REPORTED: YES
GLUCOSE SERPL-MCNC: 91 MG/DL (ref 70–99)
HCO3 SERPL-SCNC: 27 MMOL/L (ref 22–29)
HDLC SERPL-MCNC: 52 MG/DL
LDLC SERPL CALC-MCNC: 74 MG/DL
NONHDLC SERPL-MCNC: 132 MG/DL
POTASSIUM SERPL-SCNC: 4.9 MMOL/L (ref 3.4–5.3)
PROT SERPL-MCNC: 7.3 G/DL (ref 6.4–8.3)
SODIUM SERPL-SCNC: 140 MMOL/L (ref 135–145)
TRIGL SERPL-MCNC: 289 MG/DL

## 2025-01-28 ENCOUNTER — OFFICE VISIT (OUTPATIENT)
Dept: SLEEP MEDICINE | Facility: CLINIC | Age: 35
End: 2025-01-28
Attending: FAMILY MEDICINE
Payer: COMMERCIAL

## 2025-01-28 ENCOUNTER — CARE COORDINATION (OUTPATIENT)
Dept: SLEEP MEDICINE | Facility: CLINIC | Age: 35
End: 2025-01-28

## 2025-01-28 VITALS
WEIGHT: 282 LBS | HEIGHT: 68 IN | BODY MASS INDEX: 42.74 KG/M2 | SYSTOLIC BLOOD PRESSURE: 134 MMHG | OXYGEN SATURATION: 97 % | DIASTOLIC BLOOD PRESSURE: 82 MMHG | HEART RATE: 80 BPM

## 2025-01-28 DIAGNOSIS — R06.83 SNORING: ICD-10-CM

## 2025-01-28 PROCEDURE — 99215 OFFICE O/P EST HI 40 MIN: CPT | Performed by: INTERNAL MEDICINE

## 2025-01-28 ASSESSMENT — SLEEP AND FATIGUE QUESTIONNAIRES
HOW LIKELY ARE YOU TO NOD OFF OR FALL ASLEEP WHILE LYING DOWN TO REST IN THE AFTERNOON WHEN CIRCUMSTANCES PERMIT: HIGH CHANCE OF DOZING
HOW LIKELY ARE YOU TO NOD OFF OR FALL ASLEEP WHILE SITTING AND READING: MODERATE CHANCE OF DOZING
HOW LIKELY ARE YOU TO NOD OFF OR FALL ASLEEP WHEN YOU ARE A PASSENGER IN A CAR FOR AN HOUR WITHOUT A BREAK: MODERATE CHANCE OF DOZING
HOW LIKELY ARE YOU TO NOD OFF OR FALL ASLEEP WHILE SITTING QUIETLY AFTER LUNCH WITHOUT ALCOHOL: SLIGHT CHANCE OF DOZING
HOW LIKELY ARE YOU TO NOD OFF OR FALL ASLEEP IN A CAR, WHILE STOPPED FOR A FEW MINUTES IN TRAFFIC: WOULD NEVER DOZE
HOW LIKELY ARE YOU TO NOD OFF OR FALL ASLEEP WHILE WATCHING TV: MODERATE CHANCE OF DOZING
HOW LIKELY ARE YOU TO NOD OFF OR FALL ASLEEP WHILE SITTING INACTIVE IN A PUBLIC PLACE: WOULD NEVER DOZE
HOW LIKELY ARE YOU TO NOD OFF OR FALL ASLEEP WHILE SITTING AND TALKING TO SOMEONE: WOULD NEVER DOZE

## 2025-01-28 NOTE — NURSING NOTE
"Chief Complaint   Patient presents with    Sleep Problem     Need a new CPAP - but I  don't have sleep apnea       Initial /82   Pulse 80   Ht 1.727 m (5' 8\")   Wt 127.9 kg (282 lb)   SpO2 97%   BMI 42.88 kg/m   Estimated body mass index is 42.88 kg/m  as calculated from the following:    Height as of this encounter: 1.727 m (5' 8\").    Weight as of this encounter: 127.9 kg (282 lb).    Medication Reconciliation: complete  ESS 10  Olivia Kirkland MA   "

## 2025-01-28 NOTE — PROGRESS NOTES
[facility]   Outpatient Sleep Medicine Follow-up Visit  January 28, 2025    Name: Chao Pruett MRN# [id number]   Age: 34 year old YOB: 1990     Date of Consultation: January 28, 2025  Consultation is requested by: Frandy Sanchez MD  [address]  [place] [address]  Primary care provider: Frandy Sanchez           Assessment and Plan:     Sleep Diagnoses:  Loud Snoring on CPAP  Was not able to tolerate oral appliance and CPAP has been very beneficial. His sleep studies have not shown any sleep disordered breathing. His current CPAP is old and worn out and he will like a replacement and will buy out of pocket.  I gave him a prescription for a replacement one.     No orders of the defined types were placed in this encounter.      Summary Counseling:  New sleep schedule recommendation:          History of Present Illness:   Chao Pruett is a 34 year old male with a history of loud snoring on CPAP (previously intolerant to oral appliance) who presents for follow up for same.  Although he started using his grand dad's CPAP due to loud snoring with interval resolution of snoring, his sleep studies including a PSG in 2022 did not reveal any sleep disordered breathing. He had tried an oral appliance for snoring and was unable to tolerate it.  He continues to use CPAP and when he doesn't use it, his quality of sleep is poor and he has disruptive snoring. He is not sleepy nor tired during the day.  His CPAP is old and he will like a replacement.  Overall, the patient rates their experience with PAP as 10 (0 poor, 10 great). The mask is comfortable. The mask is not leaking, 0 nights per week. They are not snoring with the mask on. They are not having gasp arousals.  They are not having significant oral/nasal dryness. The pressure settings are comfortable.     Patient does feel rested in the morning.    Total score - Altair: (Patient-Rptd) 10 (1/28/2025  7:52  AM)      PREVIOUS SLEEP STUDIES:  Sleep Architecture: Summary assessment  The total recording time of the polysomnogram was 364.9 minutes. The total sleep time was 311.0 minutes. Sleep latency was decreased at 2.0 minutes without the use of a sleep aid. REM latency was 125.0 minutes. Arousal index was normal at 22.6 arousals per hour. Sleep efficiency was normal at 85.2%. Wake after sleep onset was 51.5 minutes. The patient spent 15.6% of total sleep time in Stage N1, 51.0% in Stage N2, 20.6% in Stage N3, and 12.9% in REM. Time in REM supine was 6.5 minutes.     Respiration: Summary assessment  Events ? The polysomnogram revealed a presence of 0 obstructive, 0 central, and 0 mixed apneas resulting in an apnea index of 0 events per hour. There were 0 obstructive hypopneas and 0 central hypopneas resulting in an obstructive hypopnea index of 0 and central hypopnea index of 0 events per hour. The combined apnea/hypopnea index was 0 events per hour (central apnea/hypopnea index was 0 events per hour). The REM AHI was 0 events per hour. The supine AHI was 0 events per hour. The RERA index was 3.9 events per hour.  The RDI was 3.9 events per hour.  Snoring - was reported as absent  Respiratory rate and pattern - was notable for normal respiratory rate and pattern.  Sustained Sleep Associated Hypoventilation - Transcutaneous carbon dioxide monitoring was used, however significant hypoventilation was not present with a maximum change from 33.1 to 46.2 mmHg and 0 minutes at or greater than 55 mmHg.  Sleep Associated Hypoxemia - (Greater than 5 minutes O2 sat at or below 88%) was not present. Baseline oxygen saturation was 94.8%. Lowest oxygen saturation was 92.0%. Time spent less than or equal to 88% was 0 minutes. Time spent less than or equal to 89% was 0 minutes.     Movement Activity: Summary assessment  Periodic Limb Activity - There were 63 PLMs during the entire study. The PLM index was 12.2 movements per hour. The  PLM Arousal Index was 2.9 per hour.  REM EMG Activity - Excessive transient was not present.  Nocturnal Behavior - Abnormal sleep related behaviors were not noted.  Bruxism - None apparent.     Cardiac Summary: Summary assessment  The average pulse rate was 52.3 bpm. The minimum pulse rate was 39.0 bpm while the maximum pulse rate was 82.0 bpm.  Arrhythmias were/were not noted.         Most Recent SCALES:    EPWORTH SLEEPINESS SCALE WITHIN 1 YEAR WITHIN 10 DAYS   Sitting and reading (Patient-Rptd) 2 (Patient-Rptd) 2   Watching TV (Patient-Rptd) 2 (Patient-Rptd) 2   Sitting, inactive in a public place (theatre or mtg.) (Patient-Rptd) 0  (Patient-Rptd) 0    As a passenger in a car (Patient-Rptd) 2 (Patient-Rptd) 2   Lying down to rest in the afternoon when circumstance permit (Patient-Rptd) 3 (Patient-Rptd) 3   Sitting and talking to someone (Patient-Rptd) 0 (Patient-Rptd) 0   Sitting quietly after lunch without alcohol (Patient-Rptd) 1 (Patient-Rptd) 1   In a car, while stopped for a few minutes in traffic (Patient-Rptd) 0 (Patient-Rptd) 0   TOTAL SCORE (Patient-Rptd) 10 (Patient-Rptd) 10   Normal < 11         0--none    1--mild    2--moderate  3--severe      INSOMNIA SEVERITY INDEX WITHIN 1 YEAR   Difficulty falling asleep (Patient-Rptd) 1   Difficult staying asleep (Patient-Rptd) 2   Problems waking up to early (Patient-Rptd) 2   How SATISFIED/DISSATISFIED are you with your CURRENT sleep pattern? (Patient-Rptd) 2   How NOTICEABLE to others do you think your sleep pattern is in terms of your quality of life? (Patient-Rptd) 1   How WORRIED/DISTRESSED are you about your current sleep pattern? (Patient-Rptd) 2   To what extent do you consider your sleep problem to INTERFERE with your daily fuctioning(e.g. daytime fatigue, mood, ability to function at work/daily chores, concentration, mood,etc.) CURRENTLY? (Patient-Rptd) 1   INSOMNIA SEVERITY INDEX TOTAL SCORE (Patient-Rptd) 11    --absence of insomnia (0-7);  "sub-threshold insomnia (8-14); moderate insomnia (15-21); and severe insomnia (22-28)--          MARLON Total Score: (Patient-Rptd) 11             Medications:     Current Outpatient Medications   Medication Sig Dispense Refill    citalopram (CELEXA) 40 MG tablet Take 1 tablet (40 mg) by mouth daily. 90 tablet 1    omeprazole (PRILOSEC) 20 MG DR capsule Take 1 capsule (20 mg) by mouth daily. 90 capsule 1     No current facility-administered medications for this visit.        Allergies   Allergen Reactions    No Known Drug Allergy     Seasonal Allergies             Problem List:     Patient Active Problem List   Diagnosis    Anxiety    Elevated blood pressure reading without diagnosis of hypertension    BMI 40.0-44.9, adult (H)            Past Medical History:     Does not need 02 supplement at night   Past Medical History:   Diagnosis Date    History of hypertension              Past Surgical History:    No h/o  upper airway surgery  Past Surgical History:   Procedure Laterality Date    APPENDECTOMY      ESOPHAGOSCOPY, GASTROSCOPY, DUODENOSCOPY (EGD), COMBINED N/A 7/23/2021    Procedure: ESOPHAGOGASTRODUODENOSCOPY with biopsies using biopsy forceps;  Surgeon: Sharan Albright MD;  Location: [facility]    FRACTURE TX, WRIST RT/LT      LT - hardware removed    HC OPEN TX TIBIAL SHAFT FX W PLATE/SCREWS W/WO CERCLAGE  2008              Physical Examination:   Objective:  Vitals: Ht 1.727 m (5' 8\")   Wt 127.9 kg (282 lb)   BMI 42.88 kg/m    BMI= Body mass index is 42.88 kg/m .  Exam:  Constitutional: healthy, alert, and no distress  Head: Normocephalic. No masses, lesions, tenderness or abnormalities  ENT: ENT exam normal,   Cardiovascular: negative,   Respiratory: negative,   Gastrointestinal: Deferred  : Deferred  Musculoskeletal: extremities normal- no gross deformities noted, gait normal, and normal muscle tone  Skin: no suspicious lesions or rashes  Neurologic: Gait normal. Reflexes normal and symmetric. Sensation " grossly WNL.  Psychiatric: mentation appears normal and affect normal/bright      Copy to: Frandy Sanchez MD 1/28/2025     RTC in 1 year    Total time spent reviewing medical records including previous testing and interpretation as well as direct patient contact and documentation on this date: 40 monites

## 2025-02-05 ENCOUNTER — VIRTUAL VISIT (OUTPATIENT)
Dept: UROLOGY | Facility: CLINIC | Age: 35
End: 2025-02-05
Payer: COMMERCIAL

## 2025-02-05 DIAGNOSIS — Z30.09 ENCOUNTER FOR OTHER GENERAL COUNSELING OR ADVICE ON CONTRACEPTION: ICD-10-CM

## 2025-02-05 NOTE — Clinical Note
Santos Sanchez-  I saw Elpidio via video visit today. I will get him in for an office vasectomy soon.  Please don't hesitate to contact me with further questions. I greatly appreciate the referral.   Clarissa Patel MD Cell: [phone]

## 2025-02-05 NOTE — PROGRESS NOTES
VASECTOMY CONSULTATION NOTE  DATE OF VISIT: 2/5/2025  PATIENT NAME: Chao Pruett    YOB: 1990      REASON FOR CONSULTATION: Mr. Chao Pruett is a 34 year old year old gentleman who is seen today requesting a vasectomy as a form of permanent birth control.     He has two children- a girl 4.5 years, a boy 2.5 years, and his wife pregnant with their third child-due in August. We discussed waiting until the baby was born prior to performing a vasectomy, however the patient and his wife feel strongly that regardless of the outcome of the pregnancy, they still desire to pursue a vasectomy.    He has no prior scrotal/inguinal surgeries, or hernia repairs. He had a epididymal head cyst diagnosed a few years ago. He does not take blood thinners, no hx diabetes. He does not feel his scrotum is tight to his body.     PAST MEDICAL HISTORY:   Past Medical History:   Diagnosis Date    History of hypertension        PAST SURGICAL HISTORY:   Past Surgical History:   Procedure Laterality Date    APPENDECTOMY      ESOPHAGOSCOPY, GASTROSCOPY, DUODENOSCOPY (EGD), COMBINED N/A 7/23/2021    Procedure: ESOPHAGOGASTRODUODENOSCOPY with biopsies using biopsy forceps;  Surgeon: Sharan Albright MD;  Location: [facility]    FRACTURE TX, WRIST RT/LT      LT - hardware removed    HC OPEN TX TIBIAL SHAFT FX W PLATE/SCREWS W/WO CERCLAGE  2008       MEDICATIONS:   Current Outpatient Medications:     citalopram (CELEXA) 40 MG tablet, Take 1 tablet (40 mg) by mouth daily., Disp: 90 tablet, Rfl: 1    omeprazole (PRILOSEC) 20 MG DR capsule, Take 1 capsule (20 mg) by mouth daily., Disp: 90 capsule, Rfl: 1    ALLERGIES:   Allergies   Allergen Reactions    No Known Drug Allergy     Seasonal Allergies        FAMILY HISTORY:   Family History   Problem Relation Age of Onset    Hypertension Mother     Lipids Mother     Anxiety Disorder Mother     Depression Mother     Hypertension Father     Lipids Father     Cancer Maternal Grandmother          ovarian    Diabetes Maternal Grandfather     Lipids Paternal Grandfather     No Known Problems Sister        SOCIAL HISTORY:   Social History     Socioeconomic History    Marital status:      Spouse name: Not on file    Number of children: Not on file    Years of education: Not on file    Highest education level: Not on file   Occupational History    Not on file   Tobacco Use    Smoking status: Never    Smokeless tobacco: Former     Types: Chew    Tobacco comments:     3-4x/day   Vaping Use    Vaping status: Never Used   Substance and Sexual Activity    Alcohol use: Yes     Comment: occasionally, 1 a month    Drug use: Yes     Types: Marijuana     Comment: occasionally    Sexual activity: Yes     Partners: Female   Other Topics Concern    Parent/sibling w/ CABG, MI or angioplasty before 65F 55M? No   Social History Narrative    Not on file     Social Drivers of Health     Financial Resource Strain: Low Risk  (1/20/2025)    Financial Resource Strain     Within the past 12 months, have you or your family members you live with been unable to get utilities (heat, electricity) when it was really needed?: No   Food Insecurity: Low Risk  (1/20/2025)    Food Insecurity     Within the past 12 months, did you worry that your food would run out before you got money to buy more?: No     Within the past 12 months, did the food you bought just not last and you didn t have money to get more?: No   Transportation Needs: Low Risk  (1/20/2025)    Transportation Needs     Within the past 12 months, has lack of transportation kept you from medical appointments, getting your medicines, non-medical meetings or appointments, work, or from getting things that you need?: No   Physical Activity: Insufficiently Active (1/20/2025)    Exercise Vital Sign     Days of Exercise per Week: 3 days     Minutes of Exercise per Session: 20 min   Stress: Stress Concern Present (1/20/2025)    [employer] -  Occupational Stress Questionnaire     Feeling of Stress : To some extent   Social Connections: Unknown (1/20/2025)    Social Connection and Isolation Panel [NHANES]     Frequency of Communication with Friends and Family: Not on file     Frequency of Social Gatherings with Friends and Family: Once a week     Attends Sikh Services: Not on file     Active Member of Clubs or Organizations: Not on file     Attends Club or Organization Meetings: Not on file     Marital Status: Not on file   Interpersonal Safety: Low Risk  (1/20/2025)    Interpersonal Safety     Do you feel physically and emotionally safe where you currently live?: Yes     Within the past 12 months, have you been hit, slapped, kicked or otherwise physically hurt by someone?: No     Within the past 12 months, have you been humiliated or emotionally abused in other ways by your partner or ex-partner?: No   Housing Stability: Low Risk  (1/20/2025)    Housing Stability     Do you have housing? : Yes     Are you worried about losing your housing?: No       Due to the nature of this video visit, no physical exam was performed.     DIAGNOSIS: Request for sterilization    PLAN: The risks of the procedure as well as expectations for recovery and outcomes were explained in detail to him.  He was counseled on the risks for bleeding infection and pain after the procedure. We discussed the risk of post-vasectomy pain syndrome.  He was instructed to continue to use contraception until he had proven azoospermia on a semen specimen.  This would normally be collected at least 3 months after the procedure. Also discussed the rare, but possible risk of re-canalization of the vas, even after successful vasectomy with sterile semen specimen.  He was instructed to hold all anticoagulants medications for one week prior to the procedure.  It was recommended that he have someone else drive him home after his vasectomy.  In light of these risks and expectations he would like  to proceed.  We are scheduling a vasectomy in the office in the near future.    Pt. Understands:  -1/1000-1/3000 risk of future pregnancy even with perfectly done vasectomy  -vasectomy is a permanent procedure    -he may cryopreserve sperm if he wishes   -1-5% risk of post-vasectomy pain syndrome   -1-5% risk of complication, primarily infection or bleeding  - he needs to have a semen sample that shows no sperm before getting approval for unprotected intercourse.      Thank you for the kind consultation.    15 minutes spent on the date of the encounter, including direct interaction with the patient, performing chart review, documentation and further activities as noted above.    Video-Visit Details  The patient consents to today's video visit: yes    Type of service:  Video Visit    Video Start Time:  11:39 AM    Video End Time:11:49 AM    Originating Location (pt. Location): Other work    Distant Location (provider location):  [facility]    Platform used for Video Visit: Govind Patel MD   Urology  [facility]  Clinic Phone [phone]

## 2025-02-05 NOTE — NURSING NOTE
Current patient location:  work    Is the patient currently in the state [facility]? YES    Visit mode: VIDEO    If the visit is dropped, the patient can be reconnected by:VIDEO VISIT: Text to cell phone:   Telephone Information:   Mobile [phone]       Will anyone else be joining the visit? NO  (If patient encounters technical issues they should call [phone])    Are changes needed to the allergy or medication list? Pt stated no med changes    Are refills needed on medications prescribed by this physician? NO    Rooming Documentation:  Not applicable    Reason for visit: Consult (Vasectomy )    Stephanie INGRAM

## 2025-02-05 NOTE — PROGRESS NOTES
"Virtual Visit Details    Type of service:  Video Visit     Originating Location (pt. Location): {video visit patient location:070821::\"Home\"}  {PROVIDER LOCATION On-site should be selected for visits conducted from your clinic location or adjoining [facility] hospital, academic office, or other nearby [facility] building. Off-site should be selected for all other provider locations, including home:[id number]}  Distant Location (provider location):  {virtual location provider:[id number]}  Platform used for Video Visit: {Virtual Visit Platforms:243335::\"GT Channel\"}  "

## 2025-02-05 NOTE — LETTER
2/5/2025       RE: Caho Pruett  [address] [place] [address]     Dear Colleague,    Thank you for referring your patient, Chao Pruett, to the [facility] at [facility]. Please see a copy of my visit note below.    VASECTOMY CONSULTATION NOTE  DATE OF VISIT: 2/5/2025  PATIENT NAME: Chao Pruett    YOB: 1990      REASON FOR CONSULTATION: Mr. Chao Pruett is a 34 year old year old gentleman who is seen today requesting a vasectomy as a form of permanent birth control.     He has two children- a girl 4.5 years, a boy 2.5 years, and his wife pregnant with their third child-due in August. We discussed waiting until the baby was born prior to performing a vasectomy, however the patient and his wife feel strongly that regardless of the outcome of the pregnancy, they still desire to pursue a vasectomy.    He has no prior scrotal/inguinal surgeries, or hernia repairs. He had a epididymal head cyst diagnosed a few years ago. He does not take blood thinners, no hx diabetes. He does not feel his scrotum is tight to his body.     PAST MEDICAL HISTORY:   Past Medical History:   Diagnosis Date     History of hypertension        PAST SURGICAL HISTORY:   Past Surgical History:   Procedure Laterality Date     APPENDECTOMY       ESOPHAGOSCOPY, GASTROSCOPY, DUODENOSCOPY (EGD), COMBINED N/A 7/23/2021    Procedure: ESOPHAGOGASTRODUODENOSCOPY with biopsies using biopsy forceps;  Surgeon: Sharan Albright MD;  Location: [facility]     FRACTURE TX, WRIST RT/LT      LT - hardware removed     HC OPEN TX TIBIAL SHAFT FX W PLATE/SCREWS W/WO CERCLAGE  2008       MEDICATIONS:   Current Outpatient Medications:      citalopram (CELEXA) 40 MG tablet, Take 1 tablet (40 mg) by mouth daily., Disp: 90 tablet, Rfl: 1     omeprazole (PRILOSEC) 20 MG DR capsule, Take 1 capsule (20 mg) by mouth daily., Disp: 90 capsule, Rfl: 1    ALLERGIES:    Allergies   Allergen Reactions     No Known Drug Allergy      Seasonal Allergies        FAMILY HISTORY:   Family History   Problem Relation Age of Onset     Hypertension Mother      Lipids Mother      Anxiety Disorder Mother      Depression Mother      Hypertension Father      Lipids Father      Cancer Maternal Grandmother         ovarian     Diabetes Maternal Grandfather      Lipids Paternal Grandfather      No Known Problems Sister        SOCIAL HISTORY:   Social History     Socioeconomic History     Marital status:      Spouse name: Not on file     Number of children: Not on file     Years of education: Not on file     Highest education level: Not on file   Occupational History     Not on file   Tobacco Use     Smoking status: Never     Smokeless tobacco: Former     Types: Chew     Tobacco comments:     3-4x/day   Vaping Use     Vaping status: Never Used   Substance and Sexual Activity     Alcohol use: Yes     Comment: occasionally, 1 a month     Drug use: Yes     Types: Marijuana     Comment: occasionally     Sexual activity: Yes     Partners: Female   Other Topics Concern     Parent/sibling w/ CABG, MI or angioplasty before 65F 55M? No   Social History Narrative     Not on file     Social Drivers of Health     Financial Resource Strain: Low Risk  (1/20/2025)    Financial Resource Strain      Within the past 12 months, have you or your family members you live with been unable to get utilities (heat, electricity) when it was really needed?: No   Food Insecurity: Low Risk  (1/20/2025)    Food Insecurity      Within the past 12 months, did you worry that your food would run out before you got money to buy more?: No      Within the past 12 months, did the food you bought just not last and you didn t have money to get more?: No   Transportation Needs: Low Risk  (1/20/2025)    Transportation Needs      Within the past 12 months, has lack of transportation kept you from medical appointments, getting your  medicines, non-medical meetings or appointments, work, or from getting things that you need?: No   Physical Activity: Insufficiently Active (1/20/2025)    Exercise Vital Sign      Days of Exercise per Week: 3 days      Minutes of Exercise per Session: 20 min   Stress: Stress Concern Present (1/20/2025)    [employer] - Occupational Stress Questionnaire      Feeling of Stress : To some extent   Social Connections: Unknown (1/20/2025)    Social Connection and Isolation Panel [NHANES]      Frequency of Communication with Friends and Family: Not on file      Frequency of Social Gatherings with Friends and Family: Once a week      Attends Jewish Services: Not on file      Active Member of Clubs or Organizations: Not on file      Attends Club or Organization Meetings: Not on file      Marital Status: Not on file   Interpersonal Safety: Low Risk  (1/20/2025)    Interpersonal Safety      Do you feel physically and emotionally safe where you currently live?: Yes      Within the past 12 months, have you been hit, slapped, kicked or otherwise physically hurt by someone?: No      Within the past 12 months, have you been humiliated or emotionally abused in other ways by your partner or ex-partner?: No   Housing Stability: Low Risk  (1/20/2025)    Housing Stability      Do you have housing? : Yes      Are you worried about losing your housing?: No       Due to the nature of this video visit, no physical exam was performed.     DIAGNOSIS: Request for sterilization    PLAN: The risks of the procedure as well as expectations for recovery and outcomes were explained in detail to him.  He was counseled on the risks for bleeding infection and pain after the procedure. We discussed the risk of post-vasectomy pain syndrome.  He was instructed to continue to use contraception until he had proven azoospermia on a semen specimen.  This would normally be collected at least 3 months after the procedure. Also  discussed the rare, but possible risk of re-canalization of the vas, even after successful vasectomy with sterile semen specimen.  He was instructed to hold all anticoagulants medications for one week prior to the procedure.  It was recommended that he have someone else drive him home after his vasectomy.  In light of these risks and expectations he would like to proceed.  We are scheduling a vasectomy in the office in the near future.    Pt. Understands:  -1/1000-1/3000 risk of future pregnancy even with perfectly done vasectomy  -vasectomy is a permanent procedure    -he may cryopreserve sperm if he wishes   -1-5% risk of post-vasectomy pain syndrome   -1-5% risk of complication, primarily infection or bleeding  - he needs to have a semen sample that shows no sperm before getting approval for unprotected intercourse.      Thank you for the kind consultation.    15 minutes spent on the date of the encounter, including direct interaction with the patient, performing chart review, documentation and further activities as noted above.    Video-Visit Details  The patient consents to today's video visit: yes    Type of service:  Video Visit    Video Start Time:  11:39 AM    Video End Time:11:49 AM    Originating Location (pt. Location): Other work    Distant Location (provider location):  [facility]    Platform used for Video Visit: [facility]    Clarissa Patel MD   Urology  [facility]  Clinic Phone [phone]      Again, thank you for allowing me to participate in the care of your patient.      Sincerely,    Clarissa Patel MD

## 2025-02-06 PROBLEM — Z30.09 ENCOUNTER FOR OTHER GENERAL COUNSELING OR ADVICE ON CONTRACEPTION: Status: ACTIVE | Noted: 2025-02-06

## 2025-05-30 ENCOUNTER — ANCILLARY PROCEDURE (OUTPATIENT)
Dept: GENERAL RADIOLOGY | Facility: CLINIC | Age: 35
End: 2025-05-30
Attending: NURSE PRACTITIONER
Payer: COMMERCIAL

## 2025-05-30 DIAGNOSIS — R09.A2 GLOBUS SENSATION: ICD-10-CM

## 2025-05-30 PROCEDURE — 70360 X-RAY EXAM OF NECK: CPT | Mod: TC | Performed by: RADIOLOGY

## 2025-06-02 ENCOUNTER — TRANSFERRED RECORDS (OUTPATIENT)
Dept: HEALTH INFORMATION MANAGEMENT | Facility: CLINIC | Age: 35
End: 2025-06-02
Payer: COMMERCIAL

## 2025-06-26 ENCOUNTER — OFFICE VISIT (OUTPATIENT)
Dept: FAMILY MEDICINE | Facility: CLINIC | Age: 35
End: 2025-06-26
Payer: COMMERCIAL

## 2025-06-26 VITALS
RESPIRATION RATE: 24 BRPM | OXYGEN SATURATION: 97 % | WEIGHT: 269 LBS | SYSTOLIC BLOOD PRESSURE: 152 MMHG | TEMPERATURE: 98.1 F | BODY MASS INDEX: 39.72 KG/M2 | DIASTOLIC BLOOD PRESSURE: 80 MMHG | HEART RATE: 75 BPM

## 2025-06-26 DIAGNOSIS — F41.1 GENERALIZED ANXIETY DISORDER: Primary | ICD-10-CM

## 2025-06-26 DIAGNOSIS — R03.0 ELEVATED BLOOD PRESSURE READING WITHOUT DIAGNOSIS OF HYPERTENSION: ICD-10-CM

## 2025-06-26 RX ORDER — LORAZEPAM 0.5 MG/1
0.5 TABLET ORAL DAILY PRN
Qty: 30 TABLET | Refills: 1 | Status: SHIPPED | OUTPATIENT
Start: 2025-06-26

## 2025-06-26 RX ORDER — CITALOPRAM HYDROBROMIDE 10 MG/1
10 TABLET ORAL DAILY
Qty: 90 TABLET | Refills: 3 | Status: SHIPPED | OUTPATIENT
Start: 2025-06-26

## 2025-06-26 ASSESSMENT — PATIENT HEALTH QUESTIONNAIRE - PHQ9
10. IF YOU CHECKED OFF ANY PROBLEMS, HOW DIFFICULT HAVE THESE PROBLEMS MADE IT FOR YOU TO DO YOUR WORK, TAKE CARE OF THINGS AT HOME, OR GET ALONG WITH OTHER PEOPLE: SOMEWHAT DIFFICULT
SUM OF ALL RESPONSES TO PHQ QUESTIONS 1-9: 5
SUM OF ALL RESPONSES TO PHQ QUESTIONS 1-9: 5

## 2025-06-26 ASSESSMENT — PAIN SCALES - GENERAL: PAINLEVEL_OUTOF10: NO PAIN (0)

## 2025-06-26 NOTE — PROGRESS NOTES
Assessment & Plan     Generalized anxiety disorder  Severe symptoms with some obsessive compulsive behavior, globus sensation and some panic attacks and catastrophization.  Recommend increasing Celexa from 40 mg to 50 mg, start Ativan as needed for breakthrough severe anxiety.  I will follow-up with him at his annual physical in 3 months.  - citalopram (CELEXA) 10 MG tablet  Dispense: 90 tablet; Refill: 3  - LORazepam (ATIVAN) 0.5 MG tablet  Dispense: 30 tablet; Refill: 1    Elevated blood pressure reading without diagnosis of hypertension  Possibly due to problem #1, recheck BP in 3 weeks, if no improvement, consider starting lisinopril.            Blaine Magallanes is a 34 year old, presenting for the following health issues:  Recheck Medication (Pt here to check on medication.)        6/26/2025     4:40 PM   Additional Questions   Roomed by Keri Gooden   Accompanied by Self     History of Present Illness       Reason for visit:  Med check    He eats 0-1 servings of fruits and vegetables daily.He consumes 2 sweetened beverage(s) daily.He exercises with enough effort to increase his heart rate 10 to 19 minutes per day.  He exercises with enough effort to increase his heart rate 3 or less days per week.   He is taking medications regularly.          Medication Follow Up Citalorpam  Taking Medication as prescribed: yes  Side Effects:  None  Medication Helping Symptoms:  yes    Patient is pleasant 34-year-old male who presents for interval follow-up for generalized anxiety disorder, patient ran out of medications self discontinued that he was doing well however was having severe dysfunction at work that led him to have panic attacks and feelings of an object stuck in the throat.  He later went to urgent care and was seen on 5-, since then has restarted his Celexa 40 mg, he feels some improvement today.  He is concerned that his blood pressure is also getting higher, he is currently not  having shortness of breath or chest pain.        Objective    BP (!) 152/88 (BP Location: Right arm, Patient Position: Sitting, Cuff Size: Adult Large)   Pulse 75   Temp 98.1  F (36.7  C) (Oral)   Resp 24   Wt 122 kg (269 lb)   SpO2 97%   BMI 39.72 kg/m    Body mass index is 39.72 kg/m .  Physical Exam  Vitals reviewed.   Constitutional:       Appearance: He is not ill-appearing.   Cardiovascular:      Rate and Rhythm: Normal rate and regular rhythm.   Pulmonary:      Effort: Pulmonary effort is normal.      Breath sounds: Normal breath sounds.   Psychiatric:      Comments: Anxious, fearful.  Mild pressured speech.  Flight of ideas.  No hallucinations.                    Signed Electronically by: Frandy Sanchez MD

## 2025-07-17 ENCOUNTER — ALLIED HEALTH/NURSE VISIT (OUTPATIENT)
Dept: FAMILY MEDICINE | Facility: CLINIC | Age: 35
End: 2025-07-17
Payer: COMMERCIAL

## 2025-07-17 VITALS
HEART RATE: 71 BPM | HEIGHT: 69 IN | BODY MASS INDEX: 39.07 KG/M2 | DIASTOLIC BLOOD PRESSURE: 84 MMHG | OXYGEN SATURATION: 98 % | SYSTOLIC BLOOD PRESSURE: 142 MMHG | WEIGHT: 263.8 LBS

## 2025-07-17 DIAGNOSIS — Z01.30 BP CHECK: Primary | ICD-10-CM

## 2025-07-17 NOTE — NURSING NOTE
Pt here for BP check per Dr Sanchez    155/82    142/84    Confer with clinic nurse Nayla. Ok to discharge pt if has no symptoms HA, dizziness, vision changes or chest pain. Pt states no symptoms then was discharged. Will route this to Dr Laura Stringer - Student  Cristian Núñez, [facility]

## 2025-07-17 NOTE — PROGRESS NOTES
"  {PROVIDER CHARTING PREFERENCE:855626}    Subjective   Elpidio is a 34 year old, presenting for the following health issues:  Hypertension (BP Check)        7/17/2025     4:01 PM   Additional Questions   Roomed by Bree BELTRÁN      {MA/LPN/RN Pre-Provider Visit Orders- hCG/UA/Strep (Optional):776134}  {SUPERLIST (Optional):167486}  {additonal problems for provider to add (Optional):714990}    {ROS Picklists (Optional):130987}      Objective    BP (!) 142/84 (BP Location: Right arm, Patient Position: Sitting, Cuff Size: Adult Large)   Pulse 71   Ht 1.753 m (5' 9\")   Wt 119.7 kg (263 lb 12.8 oz)   SpO2 98%   BMI 38.96 kg/m    Body mass index is 38.96 kg/m .  Physical Exam   {Exam List (Optional):152810}    {Diagnostic Test Results (Optional):328172}        Signed Electronically by: Ma/Lpn Lv  {Email feedback regarding this note to [email or web address]   :[id number]}  "

## 2025-08-24 DIAGNOSIS — F41.1 GENERALIZED ANXIETY DISORDER: ICD-10-CM

## 2025-08-25 RX ORDER — CITALOPRAM HYDROBROMIDE 40 MG/1
40 TABLET ORAL DAILY
Qty: 90 TABLET | Refills: 0 | Status: SHIPPED | OUTPATIENT
Start: 2025-08-25

## (undated) DEVICE — ENDO BITE BLOCK ADULT OLYMPUS LATEX FREE MAJ-1632

## (undated) DEVICE — KIT ENDO TURNOVER/PROCEDURE W/CLEAN A SCOPE LINERS 103888

## (undated) RX ORDER — FENTANYL CITRATE 50 UG/ML
INJECTION, SOLUTION INTRAMUSCULAR; INTRAVENOUS
Status: DISPENSED
Start: 2021-07-23